# Patient Record
Sex: FEMALE
[De-identification: names, ages, dates, MRNs, and addresses within clinical notes are randomized per-mention and may not be internally consistent; named-entity substitution may affect disease eponyms.]

---

## 2017-07-23 NOTE — ED PDOC
HPI: CCC, URI, Sore Throat


Time Seen by Provider: 17 09:01


Chief Complaint (Nursing): ENT Problem


Chief Complaint (Provider): Throat pain


History Per: Patient


Have you had recent travel within the past 21 days to any of the following 

countries: [place], [place], [place] or [place]?: No


Onset/Duration Of Symptoms: Days (1)


Current Symptoms Are (Timing): Still Present


Location Of Pain: Throat, Diffuse Myalgias


Additional History Per: Patient


Additional Complaint(s): 


The patient is a 40yo female, presents to the ED for evaluation of sore throat 

present for the past day. Patient reports bilateral ear pain and associated 

bodyaches which have been present for the past day. She states her throat pain 

is severe and she has not been able to swallow; denies any PO intake. She 

reports taking Advil for her symptoms with no relief. Patient additionally 

reports some frequency associated with suprapubic pain. She denies any fever, 

cough, chest pain, back pain. She offers no additional medical complaints. 





PCP: [facility]





Past Medical History


Reviewed: Historical Data, Nursing Documentation, Vital Signs


Vital Signs: 


 Last Vital Signs











Temp  98.2 F   17 08:48


 


Pulse  128 H  17 08:48


 


Resp  19   17 08:48


 


BP  151/85 H  17 08:48


 


Pulse Ox  99   17 10:47














- Medical History


PMH: 


   Denies: HTN





- Surgical History


Surgical History:  ( x 2)





- Family History


Family History: States: Unknown Family Hx, Hypertension (mother )





- Home Medications


Home Medications: 


 Ambulatory Orders











 Medication  Instructions  Recorded


 


Ibuprofen [Motrin] 600 mg PO TID 7 Days 16


 


Azithromycin [Zithromax Z-Santosh] 250 mg PO DAILY #5 tab 16


 


Nitrofurantoin Macrocrystals 100 mg PO BID #14 cap 16





[Macrobid]  


 


Naproxen [Naprosyn] 500 mg PO BID PRN #20 tablet 12/10/16


 


Cyclobenzaprine [Cyclobenzaprine 10 mg PO TID #20 tab 17





HCl]  


 


Ibuprofen [Motrin] 600 mg PO Q6 #20 tab 17


 


Ibuprofen [Motrin] 600 mg PO Q6 #20 tab 17














- Allergies


Allergies/Adverse Reactions: 


 Allergies











Allergy/AdvReac Type Severity Reaction Status Date / Time


 


No Known Allergies Allergy   Verified 16 14:48














Review of Systems


ROS Statement: Except As Marked, All Systems Reviewed And Found Negative


Constitutional: Negative for: Fever, Chills


ENT: Positive for: Ear Pain (b/l), Throat Pain


Cardiovascular: Negative for: Chest Pain


Gastrointestinal: Positive for: Abdominal Pain (suprapubic).  Negative for: 

Nausea, Vomiting


Genitourinary Female: Negative for: Dysuria





Physical Exam





- Reviewed


Nursing Documentation Reviewed: Yes


Vital Signs Reviewed: Yes





- Physical Exam


Appears: Positive for: Non-toxic (morbidly obsese), No Acute Distress


Head Exam: Positive for: ATRAUMATIC, NORMAL INSPECTION, NORMOCEPHALIC


Skin: Positive for: Normal Color, Warm, DRY


Eye Exam: Positive for: Normal appearance, EOMI, PERRL


ENT: Positive for: TM Is/Are (clear b/l), Pharyngeal Erythema, Tonsillar 

Exudate (bilateral exudates present), Tonsillar Swelling (hyperemic tonsils, 

symmetric, no abscess noted. ), Other (uvula midline)


Neck: Positive for: Normal, Supple


Cardiovascular/Chest: Positive for: Regular Rate, Rhythm


Respiratory: Positive for: Normal Breath Sounds.  Negative for: Respiratory 

Distress


Gastrointestinal/Abdominal: Positive for: Normal Exam, Soft, Tenderness (mild)


Back: Positive for: Normal Inspection.  Negative for: L CVA Tenderness, R CVA 

Tenderness


Extremity: Positive for: Normal ROM.  Negative for: Deformity, Swelling


Neurologic/Psych: Positive for: Alert, Oriented.  Negative for: Motor/Sensory 

Deficits





- ECG


O2 Sat by Pulse Oximetry: 99 (RA)


Pulse Ox Interpretation: Normal





- Progress


Re-evaluation Time: 10:50


Condition: Re-examined, Improved





Medical Decision Making


Medical Decision Making: 


Time: 0910


Impression: Tonsilitis, dysuria


Differential: Strep, pharyngitis, viral tonsilits, viral syndrome, rule out UTI


Plan:


-- Toradol 30 mg IM


-- Percocet 1 tab PO


-- Rapid Strep


-- Rapid Flu


Reassess





Time: 1047


Rapid flu and Strep test both negative.


Patient reports feeling much better.





Scribe Attestation:


Documented by Yazmin Angela acting as a scribe for Clarissa Avalos MD. 





Provider Attestation:


All medical record entries made by the Scribe were at my direction and 

personally dictated by me. I have reviewed the chart and agree that the record 

accurately reflects my personal performance of the history, physical exam, 

medical decision making, and the department course for this patient. I have 

also personally directed, reviewed, and agree with the discharge instructions 

and disposition.








Disposition





- Clinical Impression


Clinical Impression: 


 Tonsillitis








- Patient ED Disposition


Is Patient to be Admitted: No


Doctor Will See Patient In The: Office


Counseled Patient/Family Regarding: Studies Performed, Diagnosis, Need For 

Followup





- Disposition


Referrals: 


[facility], located within [facility] [Outside]


Disposition: Routine/Home


Disposition Time: 10:53


Condition: GOOD


Additional Instructions: 


Take advil or tylenol for pain. Follow up with your PCP in 2-3 days. 


Prescriptions: 


Ibuprofen [Motrin] 600 mg PO Q6 #20 tab


Instructions:  Tonsillitis (ED)

## 2017-09-22 ENCOUNTER — HOSPITAL ENCOUNTER (EMERGENCY)
Dept: HOSPITAL 14 - H.ER | Age: 42
Discharge: HOME | End: 2017-09-22
Payer: MEDICAID

## 2017-09-22 VITALS — DIASTOLIC BLOOD PRESSURE: 97 MMHG | SYSTOLIC BLOOD PRESSURE: 147 MMHG | RESPIRATION RATE: 18 BRPM | HEART RATE: 85 BPM

## 2017-09-22 VITALS — BODY MASS INDEX: 37.8 KG/M2

## 2017-09-22 VITALS — TEMPERATURE: 97.9 F

## 2017-09-22 DIAGNOSIS — K11.20: Primary | ICD-10-CM

## 2017-09-22 LAB
ALBUMIN/GLOB SERPL: 1.2 {RATIO} (ref 1–2.1)
ALP SERPL-CCNC: 123 U/L (ref 38–126)
ALT SERPL-CCNC: 48 U/L (ref 9–52)
AST SERPL-CCNC: 27 U/L (ref 14–36)
BASOPHILS # BLD AUTO: 0.2 K/UL (ref 0–0.2)
BASOPHILS NFR BLD: 1 % (ref 0–2)
BILIRUB SERPL-MCNC: 0.3 MG/DL (ref 0.2–1.3)
BUN SERPL-MCNC: 13 MG/DL (ref 7–17)
CALCIUM SERPL-MCNC: 9.6 MG/DL (ref 8.4–10.2)
CHLORIDE SERPL-SCNC: 104 MMOL/L (ref 98–107)
CO2 SERPL-SCNC: 26 MMOL/L (ref 22–30)
EOSINOPHIL # BLD AUTO: 1 K/UL (ref 0–0.7)
EOSINOPHIL NFR BLD: 6.1 % (ref 0–4)
ERYTHROCYTE [DISTWIDTH] IN BLOOD BY AUTOMATED COUNT: 13.6 % (ref 11.5–14.5)
GLOBULIN SER-MCNC: 3.6 GM/DL (ref 2.2–3.9)
GLUCOSE SERPL-MCNC: 107 MG/DL (ref 65–105)
HCT VFR BLD CALC: 39.6 % (ref 34–47)
LYMPHOCYTES # BLD AUTO: 4 K/UL (ref 1–4.3)
LYMPHOCYTES NFR BLD AUTO: 23.5 % (ref 20–40)
MCH RBC QN AUTO: 28.2 PG (ref 27–31)
MCHC RBC AUTO-ENTMCNC: 32.7 G/DL (ref 33–37)
MCV RBC AUTO: 86.2 FL (ref 81–99)
MONOCYTES # BLD: 0.8 K/UL (ref 0–0.8)
MONOCYTES NFR BLD: 4.8 % (ref 0–10)
NEUTROPHILS # BLD: 10.9 K/UL (ref 1.8–7)
NEUTROPHILS NFR BLD AUTO: 64.6 % (ref 50–75)
NRBC BLD AUTO-RTO: 0 % (ref 0–0)
PLATELET # BLD: 404 K/UL (ref 130–400)
PMV BLD AUTO: 8 FL (ref 7.2–11.7)
POTASSIUM SERPL-SCNC: 3.8 MMOL/L (ref 3.6–5)
PROT SERPL-MCNC: 7.8 G/DL (ref 6.3–8.2)
SODIUM SERPL-SCNC: 142 MMOL/L (ref 132–148)
WBC # BLD AUTO: 16.9 K/UL (ref 4.8–10.8)

## 2017-09-22 PROCEDURE — 96374 THER/PROPH/DIAG INJ IV PUSH: CPT

## 2017-09-22 PROCEDURE — 87040 BLOOD CULTURE FOR BACTERIA: CPT

## 2017-09-22 PROCEDURE — 70488 CT MAXILLOFACIAL W/O & W/DYE: CPT

## 2017-09-22 PROCEDURE — 96375 TX/PRO/DX INJ NEW DRUG ADDON: CPT

## 2017-09-22 PROCEDURE — 85025 COMPLETE CBC W/AUTO DIFF WBC: CPT

## 2017-09-22 PROCEDURE — 80053 COMPREHEN METABOLIC PANEL: CPT

## 2017-09-22 PROCEDURE — 81025 URINE PREGNANCY TEST: CPT

## 2017-09-22 PROCEDURE — 99281 EMR DPT VST MAYX REQ PHY/QHP: CPT

## 2017-09-22 NOTE — ED PDOC
HPI: General Adult


Time Seen by Provider: 17 19:05


Chief Complaint (Nursing): Abnormal Skin Integrity


Chief Complaint (Provider): Left sided facial pain


History Per: Patient


Onset/Duration Of Symptoms: Hrs


Additional Complaint(s): 








41 y/o female presents to the emergency department with swelling to the left 

side of the face and tingling of the tongue that began today, 2017. 

Denies dental pain or any further medical complaints.





Past Medical History


Reviewed: Historical Data, Nursing Documentation, Vital Signs


Vital Signs: 


 Last Vital Signs











Temp  97.9 F   17 23:48


 


Pulse  85   17 23:48


 


Resp  18   17 23:48


 


BP  147/97 H  17 23:48


 


Pulse Ox  100   17 23:48














- Medical History


PMH: No Chronic Diseases


   Denies: HTN





- Surgical History


Surgical History:  ( x 2)





- Family History


Family History: States: Unknown Family Hx, Hypertension (mother )





- Home Medications


Home Medications: 


 Ambulatory Orders











 Medication  Instructions  Recorded


 


Ibuprofen [Motrin] 600 mg PO TID 7 Days  tab 16


 


Azithromycin [Zithromax Z-Santosh] 250 mg PO DAILY #5 tab 16


 


Nitrofurantoin Macrocrystals 100 mg PO BID #14 cap 16





[Macrobid]  


 


Naproxen [Naprosyn] 500 mg PO BID PRN #20 tablet 12/10/16


 


Cyclobenzaprine [Cyclobenzaprine 10 mg PO TID #20 tab 17





HCl]  


 


Ibuprofen [Motrin] 600 mg PO Q6 #20 tab 17


 


Benzocaine/Menthol [Cepacol Sore 1 each MM Q6 PRN #20 lozenge 17





Throat Lozenge]  


 


Ibuprofen [Motrin] 600 mg PO Q6 #20 tab 17


 


Amoxicillin/Clavulanate [Augmentin 1 tab PO BID #14 tab 17





875 MG-125 MG]  


 


Methylprednisolone [Medrol Dose 4 mg PO DAILY #21 mg 17





Pack (21 tabs)]  


 


traMADol [Ultram] 50 mg PO TID #10 tab 17














- Allergies


Allergies/Adverse Reactions: 


 Allergies











Allergy/AdvReac Type Severity Reaction Status Date / Time


 


No Known Allergies Allergy   Verified 17 18:57














Review of Systems


ROS Statement: Except As Marked, All Systems Reviewed And Found Negative


ENT: Positive for: Other (Left-sided facial swelling with tingling of the 

tongue. No dental pain)





Physical Exam





- Reviewed


Nursing Documentation Reviewed: Yes


Vital Signs Reviewed: Yes





- Physical Exam


Appears: Positive for: Non-toxic, No Acute Distress


Head Exam: Positive for: ATRAUMATIC, NORMAL INSPECTION, NORMOCEPHALIC


Skin: Positive for: Normal Color, Warm, Dry


Eye Exam: Positive for: Other (left sided parotoid tenderness with edema).  

Negative for: Normal appearance


Neck: Positive for: Normal, Supple


Neurologic/Psych: Positive for: Alert, Oriented (x3)





- Laboratory Results


Result Diagrams: 


 17 20:40





 17 20:40





- ECG


O2 Sat by Pulse Oximetry: 98 (RA)


Pulse Ox Interpretation: Normal





Medical Decision Making


Medical Decision Making: 





Time: 19:31


Initial impression: Parotitis


Initial plan:


--Maxillofacial w/ contrast CT


--CMP


--CBC w/ diff


--Blood Culture


--Reevaluation 





Time: 21:28


--Cleocin 600 mg IV


--Toradol 30 mg IVP


--Methylprednisolone 125 mg IVP








CT:


 Left parotiditis.  





WBC 16.9





Pt doing well on re-eval, reports pain improved 





Pt given RX to continue antibiotics, advised sour candy, lots of fluid and ENT 

follow up. Return to ED if at anytime condition worsens





Scribe Attestation:


Documented by Evelia Jacob, acting as a scribe for Asuncion Saravia PA-C





Provider Scribe Attestation:


All medical record entries made by the Scribe were at my direction and 

personally dictated by me. I have reviewed the chart and agree that the record 

accurately reflects my personal performance of the history, physical exam, 

medical decision making, and the department course for this patient. I have 

also personally directed, reviewed, and agree with the discharge instructions 

and disposition.





Disposition





- Clinical Impression


Clinical Impression: 


 Parotiditis








- Patient ED Disposition


Is Patient to be Admitted: No





- Disposition


Disposition: Routine/Home


Disposition Time: 21:45


Condition: STABLE


Prescriptions: 


Amoxicillin/Clavulanate [Augmentin 875 MG-125 MG] 1 tab PO BID #14 tab


Methylprednisolone [Medrol Dose Pack (21 tabs)] 4 mg PO DAILY #21 mg


traMADol [Ultram] 50 mg PO TID #10 tab


Instructions:  Sialoadenitis (ED)


Forms:  [facility] (English)

## 2017-09-22 NOTE — ED PDOC
HPI:Nausea, Vomiting, Diarrhea


Time Seen by Provider: 17 19:05


Chief Complaint (Nursing): Abnormal Skin Integrity


Chief Complaint (Provider): Abnormal Skin Integrity


History Per: Patient


History/Exam Limitations: no limitations


Onset/Duration Of Symptoms: Hrs


Current Symptoms Are (Timing): Still Present


Additional Complaint(s): 





43 y/o female presents to the emergency department with swelling to the left 

side of the face and tingling of the tongue that began today, 2017. 

Denies dental pain or any further medical complaints. 





Past Medical History


Reviewed: Historical Data, Nursing Documentation, Vital Signs


Vital Signs: 


 Last Vital Signs











Temp  97.9 F   17 18:57


 


Pulse  88   17 18:57


 


Resp  20   17 18:57


 


BP  158/98 H  17 18:57


 


Pulse Ox  98   17 18:57














- Medical History


PMH: No Chronic Diseases


   Denies: HTN





- Surgical History


Surgical History:  ( x 2)





- Family History


Family History: States: Unknown Family Hx, Hypertension (mother )





- Home Medications


Home Medications: 


 Ambulatory Orders











 Medication  Instructions  Recorded


 


Ibuprofen [Motrin] 600 mg PO TID 7 Days  tab 16


 


Azithromycin [Zithromax Z-Santosh] 250 mg PO DAILY #5 tab 16


 


Nitrofurantoin Macrocrystals 100 mg PO BID #14 cap 16





[Macrobid]  


 


Naproxen [Naprosyn] 500 mg PO BID PRN #20 tablet 12/10/16


 


Cyclobenzaprine [Cyclobenzaprine 10 mg PO TID #20 tab 17





HCl]  


 


Ibuprofen [Motrin] 600 mg PO Q6 #20 tab 17


 


Benzocaine/Menthol [Cepacol Sore 1 each MM Q6 PRN #20 lozenge 17





Throat Lozenge]  


 


Ibuprofen [Motrin] 600 mg PO Q6 #20 tab 17














- Allergies


Allergies/Adverse Reactions: 


 Allergies











Allergy/AdvReac Type Severity Reaction Status Date / Time


 


No Known Allergies Allergy   Verified 17 18:57














Review of Systems


ROS Statement: Except As Marked, All Systems Reviewed And Found Negative


ENT: Positive for: Other (Facial swelling and tingling of the tongue. No dental 

pain. )





Physical Exam





- Reviewed


Nursing Documentation Reviewed: Yes


Vital Signs Reviewed: Yes





- Physical Exam


Appears: Positive for: Non-toxic, No Acute Distress


Head Exam: Positive for: ATRAUMATIC, NORMAL INSPECTION, NORMOCEPHALIC


Skin: Positive for: Normal Color, Warm, Dry


ENT: Positive for: Other (left sided parotoid tenderness with edema. ).  

Negative for: Normal ENT Inspection


Neck: Positive for: Normal, Supple


Neurologic/Psych: Positive for: Alert, Oriented (x3)





- ECG


O2 Sat by Pulse Oximetry: 98 (RA)


Pulse Ox Interpretation: Normal





Medical Decision Making


Medical Decision Making: 





Time: 19:31


Initial impression: Parotitis


Initial plan:


--Maxillofacial w/ contrast CT


--CMP


--CBC w/ diff


--Blood Culture


--Reevaluation 





Scribe Attestation:


Documented by Evelia Jacob, acting as a scribe for Asuncion Saravia PA-C





Provider Scribe Attestation:


All medical record entries made by the Scribe were at my direction and 

personally dictated by me. I have reviewed the chart and agree that the record 

accurately reflects my personal performance of the history, physical exam, 

medical decision making, and the department course for this patient. I have 

also personally directed, reviewed, and agree with the discharge instructions 

and disposition.





Disposition





- Disposition

## 2017-09-22 NOTE — CT
EXAM:

  CT Maxillofacial With Intravenous Contrast



EXAM DATE/TIME:

  9/22/2017 7:31 PM



CLINICAL HISTORY:

  42 years old, female; Signs and symptoms; Other: Left parotid edema erythema; 

Additional info: Left parotid edema and erythema. Sent phy. Doc.



TECHNIQUE:

  Axial computed tomography images of the face with intravenous contrast.  All 

CT scans at this facility use one or more dose reduction techniques, viz.: 

automated exposure control; ma/kV adjustment per patient size (including 

targeted exams where dose is matched to indication; i.e. head); or iterative 

reconstruction technique.

  Coronal and sagittal reformatted images were created and reviewed.



CONTRAST:

  90 mL of [id number] administered intravenously.



COMPARISON:

  No relevant prior studies available.



FINDINGS:

   

There is artifact secondary to dental hardware.



  The left parotid gland is prominent with indistinct borders and stranding in 

the surrounding fat. Findings supportive of acute infectious/inflammatory 

process/ parotiditis. 



  Multiple lymph nodes are present along the inferior aspect of the inflamed 

left parotid gland.



  No abscess.



   Bilateral tonsillar edema.



  The airway is patent.



   No significant fluid in the sinuses or mastoid air cells.





IMPRESSION:   



Left parotiditis.

## 2017-09-23 VITALS — OXYGEN SATURATION: 98 %

## 2017-09-30 ENCOUNTER — HOSPITAL ENCOUNTER (OUTPATIENT)
Dept: HOSPITAL 14 - H.ER | Age: 42
Setting detail: OBSERVATION
LOS: 2 days | Discharge: HOME | End: 2017-10-02
Attending: INTERNAL MEDICINE | Admitting: INTERNAL MEDICINE
Payer: MEDICAID

## 2017-09-30 VITALS — BODY MASS INDEX: 39.4 KG/M2

## 2017-09-30 DIAGNOSIS — N20.0: Primary | ICD-10-CM

## 2017-09-30 DIAGNOSIS — E66.01: ICD-10-CM

## 2017-09-30 DIAGNOSIS — Z87.891: ICD-10-CM

## 2017-09-30 DIAGNOSIS — R03.0: ICD-10-CM

## 2017-09-30 DIAGNOSIS — D72.829: ICD-10-CM

## 2017-09-30 DIAGNOSIS — Z98.84: ICD-10-CM

## 2017-09-30 LAB
ALBUMIN/GLOB SERPL: 1.1 {RATIO} (ref 1–2.1)
ALP SERPL-CCNC: 108 U/L (ref 38–126)
ALT SERPL-CCNC: 37 U/L (ref 9–52)
AST SERPL-CCNC: 43 U/L (ref 14–36)
BASE EXCESS BLDV CALC-SCNC: 1.2 MMOL/L (ref 0–2)
BASOPHILS # BLD AUTO: 0.1 K/UL (ref 0–0.2)
BASOPHILS NFR BLD: 0.6 % (ref 0–2)
BILIRUB SERPL-MCNC: 1 MG/DL (ref 0.2–1.3)
BILIRUB UR-MCNC: NEGATIVE MG/DL
BUN SERPL-MCNC: 12 MG/DL (ref 7–17)
CALCIUM SERPL-MCNC: 9.2 MG/DL (ref 8.4–10.2)
CHLORIDE SERPL-SCNC: 100 MMOL/L (ref 98–107)
CO2 SERPL-SCNC: 24 MMOL/L (ref 22–30)
COLOR UR: YELLOW
EOSINOPHIL # BLD AUTO: 0 K/UL (ref 0–0.7)
EOSINOPHIL NFR BLD: 0.1 % (ref 0–4)
ERYTHROCYTE [DISTWIDTH] IN BLOOD BY AUTOMATED COUNT: 13.5 % (ref 11.5–14.5)
GLOBULIN SER-MCNC: 3.9 GM/DL (ref 2.2–3.9)
GLUCOSE SERPL-MCNC: 130 MG/DL (ref 65–105)
GLUCOSE UR STRIP-MCNC: (no result) MG/DL
HCT VFR BLD CALC: 42.8 % (ref 34–47)
KETONES UR STRIP-MCNC: 80 MG/DL
LEUKOCYTE ESTERASE UR-ACNC: (no result) LEU/UL
LIPASE SERPL-CCNC: 74 U/L (ref 23–300)
LYMPHOCYTES # BLD AUTO: 2.7 K/UL (ref 1–4.3)
LYMPHOCYTES NFR BLD AUTO: 10.7 % (ref 20–40)
MCH RBC QN AUTO: 28.6 PG (ref 27–31)
MCHC RBC AUTO-ENTMCNC: 33.6 G/DL (ref 33–37)
MCV RBC AUTO: 85.1 FL (ref 81–99)
MONOCYTES # BLD: 1.3 K/UL (ref 0–0.8)
MONOCYTES NFR BLD: 5 % (ref 0–10)
NEUTROPHILS # BLD: 20.8 K/UL (ref 1.8–7)
NEUTROPHILS NFR BLD AUTO: 83.6 % (ref 50–75)
NRBC BLD AUTO-RTO: 0.1 % (ref 0–0)
PCO2 BLDV: 44 MMHG (ref 40–60)
PH BLDV: 7.39 [PH] (ref 7.32–7.43)
PH UR STRIP: 8 [PH] (ref 5–8)
PLATELET # BLD: 462 K/UL (ref 130–400)
PMV BLD AUTO: 8.6 FL (ref 7.2–11.7)
POTASSIUM SERPL-SCNC: 5 MMOL/L (ref 3.6–5)
PROT SERPL-MCNC: 8.1 G/DL (ref 6.3–8.2)
PROT UR STRIP-MCNC: 30 MG/DL
RBC # UR STRIP: NEGATIVE /UL
RBC #/AREA URNS HPF: 5 /HPF (ref 0–3)
SODIUM SERPL-SCNC: 140 MMOL/L (ref 132–148)
SP GR UR STRIP: 1.02 (ref 1–1.03)
UROBILINOGEN UR-MCNC: (no result) MG/DL (ref 0.2–1)
WBC # BLD AUTO: 24.9 K/UL (ref 4.8–10.8)
WBC #/AREA URNS HPF: 6 /HPF (ref 0–5)

## 2017-09-30 PROCEDURE — 82607 VITAMIN B-12: CPT

## 2017-09-30 PROCEDURE — 87040 BLOOD CULTURE FOR BACTERIA: CPT

## 2017-09-30 PROCEDURE — 82803 BLOOD GASES ANY COMBINATION: CPT

## 2017-09-30 PROCEDURE — 74177 CT ABD & PELVIS W/CONTRAST: CPT

## 2017-09-30 PROCEDURE — 96374 THER/PROPH/DIAG INJ IV PUSH: CPT

## 2017-09-30 PROCEDURE — 80053 COMPREHEN METABOLIC PANEL: CPT

## 2017-09-30 PROCEDURE — 85025 COMPLETE CBC W/AUTO DIFF WBC: CPT

## 2017-09-30 PROCEDURE — 99285 EMERGENCY DEPT VISIT HI MDM: CPT

## 2017-09-30 PROCEDURE — 74022 RADEX COMPL AQT ABD SERIES: CPT

## 2017-09-30 PROCEDURE — 36415 COLL VENOUS BLD VENIPUNCTURE: CPT

## 2017-09-30 PROCEDURE — 85027 COMPLETE CBC AUTOMATED: CPT

## 2017-09-30 PROCEDURE — 81003 URINALYSIS AUTO W/O SCOPE: CPT

## 2017-09-30 PROCEDURE — 96375 TX/PRO/DX INJ NEW DRUG ADDON: CPT

## 2017-09-30 PROCEDURE — 81025 URINE PREGNANCY TEST: CPT

## 2017-09-30 PROCEDURE — 87086 URINE CULTURE/COLONY COUNT: CPT

## 2017-09-30 PROCEDURE — 83036 HEMOGLOBIN GLYCOSYLATED A1C: CPT

## 2017-09-30 PROCEDURE — 83690 ASSAY OF LIPASE: CPT

## 2017-09-30 PROCEDURE — 96376 TX/PRO/DX INJ SAME DRUG ADON: CPT

## 2017-09-30 PROCEDURE — 80061 LIPID PANEL: CPT

## 2017-09-30 PROCEDURE — 84443 ASSAY THYROID STIM HORMONE: CPT

## 2017-09-30 RX ADMIN — HYOSCYAMINE SULFATE SCH MG: 0.12 TABLET ORAL at 21:06

## 2017-09-30 RX ADMIN — HYDROMORPHONE HYDROCHLORIDE STA MG: 1 INJECTION, SOLUTION INTRAMUSCULAR; INTRAVENOUS; SUBCUTANEOUS at 11:54

## 2017-09-30 RX ADMIN — HYDROMORPHONE HYDROCHLORIDE STA MG: 1 INJECTION, SOLUTION INTRAMUSCULAR; INTRAVENOUS; SUBCUTANEOUS at 17:06

## 2017-09-30 NOTE — RAD
PROCEDURE:  Radiographs of the chest and abdomen (obstructive series)



HISTORY:

ABDOMINAL PAIN  



COMPARISON:

Abdomen and pelvis CT 09/30/2017.



TECHNIQUE:

AP radiograph of the chest, with upright and supine radiographs of 

the abdomen.



FINDINGS:



CHEST:

Lungs: Clear.



Cardiovascular: Cardiomegaly noted. No pulmonary vascular derangement 

identified.



Pleura: No pleural fluid. No pneumothorax.



Other findings: None.



ABDOMEN AND PELVIS:

Bowel: Nonobstructive bowel gas pattern appreciate. No free 

intrarenal gas evident. Overall pattern is concurrent with CT exam 

09/30/2017 as well.



Free air: None.



Bones:  Unremarkable.



Other findings: Tubing from gastric balloon noted at the region of 

the stomach.



IMPRESSION:

As above.

## 2017-09-30 NOTE — ED PDOC
HPI: Abdomen


Time Seen by Provider: 17 10:38


Chief Complaint (Nursing): Abdominal Pain


Chief Complaint (Provider): ABDOMINAL PAIN


History Per: Patient (43 Y/O FEMALE HERE WITH EPIGASTRIC PAIN ASSOCIATED WITH 

VOMITING. HAS HAD DUAL BALLOON PLACEMENT IN STOMACHE YESTERDAY BY DR. DAVIS.  

FOSTER ELLIS FEVERS/CHILLS/DYSURIA. NORMAL BM TODAY.)





Past Medical History


Reviewed: Historical Data, Nursing Documentation, Vital Signs


Vital Signs: 


 Last Vital Signs











Temp  98.7 F   17 10:10


 


Pulse  75   17 10:10


 


Resp  17   17 10:10


 


BP  156/77 H  17 10:10


 


Pulse Ox  100   17 12:03














- Medical History


PMH: 


   Denies: HTN





- Surgical History


Surgical History:  ( x 2)





- Family History


Family History: States: Unknown Family Hx, Hypertension (mother )





- Home Medications


Home Medications: 


 Ambulatory Orders











 Medication  Instructions  Recorded


 


Nitrofurantoin Macrocrystals 100 mg PO BID #14 cap 16





[Macrobid]  


 


Naproxen [Naprosyn] 500 mg PO BID PRN #20 tablet 12/10/16


 


Cyclobenzaprine [Cyclobenzaprine 10 mg PO TID #20 tab 17





HCl]  


 


Methylprednisolone [Medrol Dose 4 mg PO DAILY #21 mg 17





Pack (21 tabs)]  


 


traMADol [Ultram] 50 mg PO TID #10 tab 17














- Allergies


Allergies/Adverse Reactions: 


 Allergies











Allergy/AdvReac Type Severity Reaction Status Date / Time


 


No Known Allergies Allergy   Verified 17 18:57














Review of Systems


ROS Statement: Except As Marked, All Systems Reviewed And Found Negative





Physical Exam





- Reviewed


Nursing Documentation Reviewed: Yes


Vital Signs Reviewed: Yes





- Physical Exam


Appears: Positive for: Well, Non-toxic, No Acute Distress


Head Exam: Positive for: ATRAUMATIC, NORMAL INSPECTION, NORMOCEPHALIC


Skin: Positive for: Normal Color, Warm, DRY


Eye Exam: Positive for: EOMI, Normal appearance, PERRL


ENT: Positive for: Normal ENT Inspection


Neck: Positive for: Normal, Painless ROM


Cardiovascular/Chest: Positive for: Regular Rate, Rhythm


Respiratory: Positive for: CNT, Normal Breath Sounds


Gastrointestinal/Abdominal: Positive for: Normal Exam, Bowel Sounds, Soft


Back: Positive for: Normal Inspection


Extremity: Positive for: Normal ROM


Neurologic/Psych: Positive for: Alert, Oriented





- Laboratory Results


Result Diagrams: 


 17 11:00





 17 11:00





- ECG


O2 Sat by Pulse Oximetry: 100





ED OBSERVATION


Date of observation admission: 17


Time of observation admission: 12:02





- Observation admission statement


Patient is being placed in observation because:: 





ABDOMINAL PAIN





- Goals of Observation


Goals of observation are:: 





EVALUATION/ASSESSMENT OF ABDOMINAL PAIN


IMPROVEMENT OF SYMPTOMS





- Progress Note


Progress Note: 





17 12:02








ZOFRAN 8 MG IV X 1 DOSE


PEPCID 20 MG IV X 1 DOSE





NS 1 LITER WIDE OPEN





PATIENT PREPPED FOR CT ABD/PELIVS


UNABLE TO TOLERATE





PHENERGAN 25 MG IV 





DILAUDID 0.5 MG IV 





CBC NOTED ELEVATED >20.  WILL SEND LACTATE/BC/UCX.





 FINDINGS:  


   Lower thorax:  Small hiatal hernia.  


 


  ABDOMEN:  


   Liver:  Unremarkable.  


   Gallbladder and bile ducts:  No calcified stones.  No ductal dilation.  


   Pancreas:  Unremarkable.  No ductal dilation.  


   Spleen:  No splenomegaly.  


   Adrenals:  No mass.  


   Kidneys and ureters:  Few small calculi within LEFT kidney.  No   


 hydronephrosis.  


   Stomach and bowel:  Cecum within pelvis.  No definite mural thickening.  No 

  


 obstruction.  


   Appendix:  No findings to suggest acute appendicitis.  


 


  PELVIS:  


   Bladder:  Unremarkable.  No stones.  


   Reproductive:  Small ovarian follicles.  


 


  ABDOMEN and PELVIS:  


   Intraperitoneal space:  No significant fluid collection.  No free air.  


   Bones/joints:  Chronic L5 pars defects.  Mild curvature of spine.  Mild   


 degenerative changes of hip joints.  No acute fracture.  


   Soft tissues:  Unremarkable.  


   Vasculature:  Unremarkable.  No aneurysm.  


   Lymph nodes:  No pathologically enlarged lymph nodes.  


 


 IMPRESSION:       


 1.  Nonobstructing renal calculi.  


 2.  Incidental/non-acute findings are described above.  


 


                                                            Dictated By:  

Evans Alfaro MD      


                                                            Dictated Date/Time:

  17  


                                                            Signed By: Evans Alfaro MD  


                                                            Date Signed: 55  


                                                Transcribed By: KAVITHA  


                                                            Transcribe Date/Time

: 17 16:39








Disposition





- Clinical Impression


Clinical Impression: 


 Intractable vomiting, Leukocytosis, Abdominal pain in female








- Patient ED Disposition


Is Patient to be Admitted: Yes





- Disposition


Disposition Time: 15:15


Condition: FAIR





- Pt Status Changed To:


Hospital Disposition Of: Observation

## 2017-10-01 VITALS — RESPIRATION RATE: 20 BRPM

## 2017-10-01 VITALS — OXYGEN SATURATION: 98 %

## 2017-10-01 LAB — TSH SERPL-ACNC: 1.06 MIU/ML (ref 0.46–4.68)

## 2017-10-01 RX ADMIN — HYOSCYAMINE SULFATE SCH MG: 0.12 TABLET ORAL at 10:21

## 2017-10-01 RX ADMIN — HYOSCYAMINE SULFATE SCH MG: 0.12 TABLET ORAL at 04:31

## 2017-10-01 RX ADMIN — HYOSCYAMINE SULFATE SCH MG: 0.12 TABLET ORAL at 20:18

## 2017-10-01 RX ADMIN — PANTOPRAZOLE SODIUM SCH MG: 40 TABLET, DELAYED RELEASE ORAL at 10:22

## 2017-10-01 RX ADMIN — HYOSCYAMINE SULFATE SCH MG: 0.12 TABLET ORAL at 13:47

## 2017-10-01 RX ADMIN — HYOSCYAMINE SULFATE SCH: 0.12 TABLET ORAL at 01:35

## 2017-10-01 RX ADMIN — HYOSCYAMINE SULFATE SCH MG: 0.12 TABLET ORAL at 17:24

## 2017-10-01 NOTE — CP.PCM.CON
History of Present Illness





- History of Present Illness


History of Present Illness: 





Consult note- General Surgery





42F s/p gastric procedure 'Reshape" POD#2 presented to [facility] ED with multiple 

episodes NBNB vomiting, nausea and abdominal pain that started Friday night 

into Saturday. Procedure was performed by Dr. Sullivan at [place]. Denies 

fevers, chills, chest pain, shortness of breath, numbness/tingling down 

extremities.





Boyfriend present at bedside, would not like to discuss her medical history 

while he is in the room.  





PMH: none


PSH: abdominoplasty, Gastric "reshape" (balloon),  x2


ALL: NKDA





Review of Systems





- Review of Systems


All systems: reviewed and no additional remarkable complaints except





- Constitutional


Constitutional: As Per HPI





Past Patient History





- Infectious Disease


Hx of Infectious Diseases: None





- Past Medical History & Family History


Past Medical History?: No





- Past Social History


Smoking Status: Former Smoker





- CARDIAC


Hx Cardiac Disorders: No





- PULMONARY


Hx Respiratory Disorders: No





- NEUROLOGICAL


Hx Neurological Disorder: No





- HEENT


Hx HEENT Problems: No





- RENAL


Hx Chronic Kidney Disease: No





- ENDOCRINE/METABOLIC


Hx Endocrine Disorders: No





- HEMATOLOGICAL/ONCOLOGICAL


Hx Blood Disorders: No





- INTEGUMENTARY


Hx Dermatological Problems: No





- MUSCULOSKELETAL/RHEUMATOLOGICAL


Hx Musculoskeletal Disorders: No


Hx Falls: No





- GASTROINTESTINAL


Hx Gastrointestinal Disorders: No





- GENITOURINARY/GYNECOLOGICAL


Hx Genitourinary Disorders: No





- PSYCHIATRIC


Hx Psychophysiologic Disorder: No


Hx Substance Use: No





- SURGICAL HISTORY


Hx Surgeries: Yes


Hx  Section: Yes (x2)


Other/Comment: gastric baloon 17.  Ene Cohen 3 yrs ago





- ANESTHESIA


Hx Anesthesia: Yes


Hx Anesthesia Reactions: No





Meds


Allergies/Adverse Reactions: 


 Allergies











Allergy/AdvReac Type Severity Reaction Status Date / Time


 


No Known Allergies Allergy   Verified 17 18:57














- Medications


Medications: 


 Current Medications





Cyclobenzaprine HCl (Flexeril)  10 mg PO TID [facility]


   Last Admin: 10/01/17 10:20 Dose:  10 mg


Diazepam (Valium)  5 mg PO Q4 PRN


   PRN Reason: Sleep


Famotidine (Pepcid)  20 mg IVP Q12 [facility]


   Last Admin: 17 21:09 Dose:  20 mg


Hyoscyamine (Levsin)  0.125 mg PO Q4 [facility]


   Last Admin: 10/01/17 10:21 Dose:  0.125 mg


Naproxen (Naproxen)  500 mg PO BID PRN


   PRN Reason: Pain, moderate (4-7)


   Last Admin: 10/01/17 10:20 Dose:  500 mg


Nitrofurantoin Macrocrystals (Macrobid)  100 mg PO BID [facility]


   Last Admin: 10/01/17 10:20 Dose:  100 mg


Ondansetron HCl (Zofran Inj)  4 mg IVP Q6 PRN


   PRN Reason: Nausea/Vomiting


   Last Admin: 10/01/17 04:15 Dose:  4 mg


Pantoprazole Sodium (Protonix Ec Tab)  40 mg PO DAILY [facility]


   Last Admin: 10/01/17 10:22 Dose:  40 mg


Tramadol HCl (Ultram)  50 mg PO TID [facility]











Physical Exam





- Constitutional


Appears: Non-toxic, No Acute Distress





- Head Exam


Head Exam: ATRAUMATIC





- Eye Exam


Eye Exam: EOMI.  absent: Scleral icterus





- ENT Exam


ENT Exam: Mucous Membranes Moist





- Respiratory Exam


Respiratory Exam: NORMAL BREATHING PATTERN.  absent: Accessory Muscle Use, 

Respiratory Distress





- Cardiovascular Exam


Cardiovascular Exam: +S1, +S2





- GI/Abdominal Exam


GI & Abdominal Exam: Soft, Tenderness.  absent: Distended, Firm, Guarding, 

Hernia, Organomegaly, Pulsatile Mass, Rebound, Rigid





- Extremities Exam


Extremities exam: Positive for: normal inspection.  Negative for: calf 

tenderness





- Back Exam


Back exam: absent: CVA tenderness (L), CVA tenderness (R)





- Neurological Exam


Neurological exam: Alert, Oriented x3





- Skin


Skin Exam: Normal Color, Warm





Results





- Vital Signs


Recent Vital Signs: 


 Last Vital Signs











Temp  98.1 F   10/01/17 07:48


 


Pulse  68   10/01/17 07:48


 


Resp  19   10/01/17 07:48


 


BP  165/93 H  10/01/17 07:48


 


Pulse Ox  98   10/01/17 07:48














- Labs


Result Diagrams: 


 17 11:00





 17 11:00


Labs: 


 Laboratory Results - last 24 hr











  17





  11:00 11:00 11:00


 


WBC  24.9 H  


 


RBC  5.03  


 


Hgb  14.4  


 


Hct  42.8  


 


MCV  85.1  


 


MCH  28.6  


 


MCHC  33.6  


 


RDW  13.5  


 


Plt Count  462 H  


 


MPV  8.6  


 


Neut % (Auto)  83.6 H  


 


Lymph % (Auto)  10.7 L  


 


Mono % (Auto)  5.0  


 


Eos % (Auto)  0.1  


 


Baso % (Auto)  0.6  


 


Neut #  20.8 H  


 


Lymph #  2.7  


 


Mono #  1.3 H  


 


Eos #  0.0  


 


Baso #  0.1  


 


pO2   


 


VBG pH   


 


VBG pCO2   


 


VBG HCO3   


 


VBG Total CO2   


 


VBG O2 Sat (Calc)   


 


VBG Base Excess   


 


VBG Potassium   


 


Glucose   


 


Lactate   


 


FiO2   


 


Sodium   140 


 


Potassium   5.0 


 


Chloride   100 


 


Carbon Dioxide   24 


 


Anion Gap   21 H 


 


BUN   12 


 


Creatinine   0.5 L 


 


Est GFR ( Amer)   > 60 


 


Est GFR (Non-Af Amer)   > 60 


 


Random Glucose   130 H 


 


Calcium   9.2 


 


Total Bilirubin   1.0 


 


AST   43 H D 


 


ALT   37 


 


Alkaline Phosphatase   108 


 


Total Protein   8.1 


 


Albumin   4.2 


 


Globulin   3.9 


 


Albumin/Globulin Ratio   1.1 


 


Lipase   74 


 


Vitamin B12   


 


TSH 3rd Generation   


 


Venous Blood Potassium   


 


Urine Color    Yellow


 


Urine Clarity    Turbid


 


Urine pH    8.0


 


Ur Specific Gravity    1.018


 


Urine Protein    30


 


Urine Glucose (UA)    Neg


 


Urine Ketones    80


 


Urine Blood    Negative


 


Urine Nitrate    Negative


 


Urine Bilirubin    Negative


 


Urine Urobilinogen    0.2-1.0


 


Ur Leukocyte Esterase    Neg


 


Urine RBC (Auto)    5 H


 


Urine Microscopic WBC    6 H


 


Ur Squamous Epith Cells    5


 


Amorphous Sediment    Few H














  09/30/17 10/01/17





  12:24 08:30


 


WBC  


 


RBC  


 


Hgb  


 


Hct  


 


MCV  


 


MCH  


 


MCHC  


 


RDW  


 


Plt Count  


 


MPV  


 


Neut % (Auto)  


 


Lymph % (Auto)  


 


Mono % (Auto)  


 


Eos % (Auto)  


 


Baso % (Auto)  


 


Neut #  


 


Lymph #  


 


Mono #  


 


Eos #  


 


Baso #  


 


pO2  57 H 


 


VBG pH  7.39 


 


VBG pCO2  44 


 


VBG HCO3  25.6 


 


VBG Total CO2  28.0 


 


VBG O2 Sat (Calc)  92.9 H 


 


VBG Base Excess  1.2 


 


VBG Potassium  3.7 


 


Glucose  139 H 


 


Lactate  1.2 


 


FiO2  21.0 


 


Sodium  135.0 


 


Potassium  


 


Chloride  102.0 


 


Carbon Dioxide  


 


Anion Gap  


 


BUN  


 


Creatinine  


 


Est GFR ( Amer)  


 


Est GFR (Non-Af Amer)  


 


Random Glucose  


 


Calcium  


 


Total Bilirubin  


 


AST  


 


ALT  


 


Alkaline Phosphatase  


 


Total Protein  


 


Albumin  


 


Globulin  


 


Albumin/Globulin Ratio  


 


Lipase  


 


Vitamin B12   481


 


TSH 3rd Generation   1.06


 


Venous Blood Potassium  3.7 


 


Urine Color  


 


Urine Clarity  


 


Urine pH  


 


Ur Specific Gravity  


 


Urine Protein  


 


Urine Glucose (UA)  


 


Urine Ketones  


 


Urine Blood  


 


Urine Nitrate  


 


Urine Bilirubin  


 


Urine Urobilinogen  


 


Ur Leukocyte Esterase  


 


Urine RBC (Auto)  


 


Urine Microscopic WBC  


 


Ur Squamous Epith Cells  


 


Amorphous Sediment  














Assessment & Plan





- Assessment and Plan (Free Text)


Assessment: 





42F w/ abd pain s/p gastric reshape POD#2 from [place]


Plan: 





spoke with Dr. Sullivan on the phone. Is aware patient is in the hospital. Advised 

that this is normal post op pain and that patient can call him and his office 

directly. 


- bolus 1L IV fluid


- scopalamine patch


- no surgical intervention


- pt to follow up in office with Dr. Sullivan





will d/w Dr. Favio Hollingsworth PGY1

## 2017-10-01 NOTE — CP.PCM.CON
<Mery Allen - Last Filed: 10/01/17 15:32>





History of Present Illness





- History of Present Illness


History of Present Illness: 


PGY4 Initial Consult Note 





Cortney Alvarado is a 42F with no sig hx other than s/p gastric procedure '

Reshape" vs gastric balloon? POD#2 presented to [facility] ED with complaints of 

multiple episodes vomiting, nausea and abdominal pain. Pt states that the pain 

started a few hrs after the procedure and has progressively worsened. She 

states that the pain is 10 out of 10. She states that the pain is diffuse. She 

notes that she followed the instructions provided to her after the procedure. 

She states that her symptoms worsen after any PO intake liquid or solids. She 

states that she has normal BM daily without melena, hematemsis, BRBPR, or coffee

-ground emesis. The procedure was performed by Dr. Sullivan at [place]. Denies 

fevers, chills, chest pain, shortness of breath, numbness/tingling down 

extremities.


 





PMH: none


PSH: abdominoplasty, Gastric "reshape" (balloon),  x2


ALL: NKDA


Social hx: denies etoh, smoking or illicit drugs


Endoscopy hx: denies 





ROS: 12 point ROS conducted neg other than above 








Past Patient History





- Infectious Disease


Hx of Infectious Diseases: None





- Past Medical History & Family History


Past Medical History?: No





- Past Social History


Smoking Status: Former Smoker





- CARDIAC


Hx Cardiac Disorders: No





- PULMONARY


Hx Respiratory Disorders: No





- NEUROLOGICAL


Hx Neurological Disorder: No





- HEENT


Hx HEENT Problems: No





- RENAL


Hx Chronic Kidney Disease: No





- ENDOCRINE/METABOLIC


Hx Endocrine Disorders: No





- HEMATOLOGICAL/ONCOLOGICAL


Hx Blood Disorders: No





- INTEGUMENTARY


Hx Dermatological Problems: No





- MUSCULOSKELETAL/RHEUMATOLOGICAL


Hx Musculoskeletal Disorders: No


Hx Falls: No





- GASTROINTESTINAL


Hx Gastrointestinal Disorders: No





- GENITOURINARY/GYNECOLOGICAL


Hx Genitourinary Disorders: No





- PSYCHIATRIC


Hx Psychophysiologic Disorder: No


Hx Substance Use: No





- SURGICAL HISTORY


Hx Surgeries: Yes


Hx  Section: Yes (x2)


Other/Comment: gastric baloon 17.  Ene Cohen 3 yrs ago





- ANESTHESIA


Hx Anesthesia: Yes


Hx Anesthesia Reactions: No





Meds


Allergies/Adverse Reactions: 


 Allergies











Allergy/AdvReac Type Severity Reaction Status Date / Time


 


No Known Allergies Allergy   Verified 17 18:57














- Medications


Medications: 


 Current Medications





Cyclobenzaprine HCl (Flexeril)  10 mg PO TID [facility]


   Last Admin: 10/01/17 13:46 Dose:  10 mg


Diazepam (Valium)  5 mg PO Q4 PRN


   PRN Reason: Sleep


Famotidine (Pepcid)  20 mg IVP Q12 [facility]


   Last Admin: 10/01/17 13:47 Dose:  Not Given


Hyoscyamine (Levsin)  0.125 mg PO Q4 [facility]


   Last Admin: 10/01/17 13:47 Dose:  0.125 mg


Naproxen (Naproxen)  500 mg PO BID PRN


   PRN Reason: Pain, moderate (4-7)


   Last Admin: 10/01/17 13:46 Dose:  500 mg


Nitrofurantoin Macrocrystals (Macrobid)  100 mg PO BID [facility]


   Last Admin: 10/01/17 10:20 Dose:  100 mg


Ondansetron HCl (Zofran Inj)  4 mg IVP Q6 PRN


   PRN Reason: Nausea/Vomiting


   Last Admin: 10/01/17 04:15 Dose:  4 mg


Pantoprazole Sodium (Protonix Ec Tab)  40 mg PO DAILY [facility]


   Last Admin: 10/01/17 10:22 Dose:  40 mg


Tramadol HCl (Ultram)  50 mg PO TID [facility]


   Last Admin: 10/01/17 13:48 Dose:  Not Given











Physical Exam





- Head Exam


Head Exam: ATRAUMATIC, NORMOCEPHALIC





- Eye Exam


Eye Exam: Normal appearance





- ENT Exam


ENT Exam: Mucous Membranes Moist, Normal Exam





- Respiratory Exam


Respiratory Exam: Clear to Auscultation Bilateral, NORMAL BREATHING PATTERN.  

absent: Rales, Rhonchi, Wheezes, Respiratory Distress





- Cardiovascular Exam


Cardiovascular Exam: REGULAR RHYTHM, +S1, +S2





- GI/Abdominal Exam


GI & Abdominal Exam: Normal Bowel Sounds, Soft, Tenderness (diffuse).  absent: 

Organomegaly, Rebound, Rigid





- Extremities Exam


Extremities exam: Negative for: joint swelling, pedal edema





- Neurological Exam


Neurological exam: Alert, Oriented x3





- Psychiatric Exam


Psychiatric exam: Normal Affect, Normal Mood





- Skin


Skin Exam: Dry, Intact, Normal Color, Warm





Results





- Vital Signs


Recent Vital Signs: 


 Last Vital Signs











Temp  98.1 F   10/01/17 07:48


 


Pulse  68   10/01/17 07:48


 


Resp  19   10/01/17 07:48


 


BP  165/93 H  10/01/17 07:48


 


Pulse Ox  98   10/01/17 07:48














- Labs


Result Diagrams: 


 17 11:00





 17 11:00


Labs: 


 Laboratory Results - last 24 hr











  10/01/17





  08:30


 


Vitamin B12  481


 


TSH 3rd Generation  1.06














Assessment & Plan





- Assessment and Plan (Free Text)


Assessment: 


Cortney Alvarado is a 42F with no sig procedure is POD #2 gastric balloon vs 

remodeling who presents with intractable nausea, vomiting, and abd pain. CT abd 

does not reveal a GOO and that the device is intact. General surgery contacted 

Dr. Sullivan and states that the symtoms are normal post op. 





1. intractable nausea/vomiting/abd pain 


2. S/P gastric balloon vs remodeling surgery for weight loss





Plan: 


-no clear etiology for severe symptoms


-advance diet as tolerated


-agree with general surgery, recommend follow-up with Dr. Sullivan 


-recommend d/c with zofran 


-Recommend pt contact Dr. Sullivan for follow-up


-follow post diet instructions


-cannot offer any GI interventions at this time


-GI px


-DVT px





D/W Dr. Colón








<[facility] - Last Filed: 10/01/17 17:57>





Meds





- Medications


Medications: 


 Current Medications





Cyclobenzaprine HCl (Flexeril)  10 mg PO TID [facility]


   Last Admin: 10/01/17 17:24 Dose:  10 mg


Diazepam (Valium)  5 mg PO Q4 PRN


   PRN Reason: Sleep


Famotidine (Pepcid)  20 mg IVP Q12 [facility]


   Last Admin: 10/01/17 13:47 Dose:  Not Given


Hyoscyamine (Levsin)  0.125 mg PO Q4 [facility]


   Last Admin: 10/01/17 17:24 Dose:  0.125 mg


Naproxen (Naproxen)  500 mg PO BID PRN


   PRN Reason: Pain, moderate (4-7)


   Last Admin: 10/01/17 13:46 Dose:  500 mg


Nitrofurantoin Macrocrystals (Macrobid)  100 mg PO BID [facility]


   Last Admin: 10/01/17 17:24 Dose:  100 mg


Ondansetron HCl (Zofran Inj)  4 mg IVP Q6 PRN


   PRN Reason: Nausea/Vomiting


   Last Admin: 10/01/17 04:15 Dose:  4 mg


Pantoprazole Sodium (Protonix Ec Tab)  40 mg PO DAILY [facility]


   Last Admin: 10/01/17 10:22 Dose:  40 mg


Tramadol HCl (Ultram)  50 mg PO TID [facility]


   Last Admin: 10/01/17 17:27 Dose:  50 mg











Results





- Vital Signs


Recent Vital Signs: 


 Last Vital Signs











Temp  98.6 F   10/01/17 16:26


 


Pulse  70   10/01/17 16:26


 


Resp  20   10/01/17 16:26


 


BP  165/93 H  10/01/17 07:48


 


Pulse Ox  98   10/01/17 16:26














- Labs


Result Diagrams: 


 17 11:00





 17 11:00


Labs: 


 Laboratory Results - last 24 hr











  10/01/17





  08:30


 


Vitamin B12  481


 


TSH 3rd Generation  1.06














Attending/Attestation





- Attestation


I have personally seen and examined this patient.: Yes


I have fully participated in the care of the patient.: Yes


I have reviewed all pertinent clinical information: Yes


Notes (Text): 





10/01/17 17:54


Patient seen and examined at bedside with GI fellow. This is a 42 yr old F with 

gastric balloon 2 days ago who now presents with intractable nausea, vomiting, 

and abdominal pain. CT abdomen does not reveal outlet obstruction with normal 

lumen. General surgery contacted Dr. Sullivan and states that the symptoms are 

normal post op. Patient was adviced to follow with her gastric surgeon as this 

may happen due to hyperinflatin. No GI intervention required. Supportive care. 

PPi daily. Anti emetics as needed. Will sign off. Thank you for letting us 

participate in the care of your patient

## 2017-10-01 NOTE — HP
CHIEF COMPLAINT:  Abdominal pain.



HISTORY OF PRESENT ILLNESS:  This is a 42-year-old female who recently

underwent gastric balloon placement for weight loss procedure the day

before admission and started having abdominal pain, so the patient was

brought to the emergency room and was admitted for further management.



REVIEW OF SYSTEMS:  Positive for not feeling well and abdominal pain. 

Review of systems otherwise is negative for headache, dizziness, syncope,

loss of consciousness, chest pain, shortness of breath, nausea, vomiting,

diarrhea, constipation, any knee joint or extremity pain.  Review of

systems of all other organ system are unremarkable.



PAST MEDICAL HISTORY:  Significant for morbid obesity.



PAST SURGICAL HISTORY:  Remarkable for recent gastric balloon placement.



PERSONAL HISTORY:  The patient is currently a nonsmoker, nondrinker.  No

substance abuse.



MEDICATIONS:  The patient is on Macrobid, Flexeril, prednisolone and

Tramadol.



ALLERGIES:  The patient is not allergic to any medications.



FAMILY HISTORY:  Noncontributory.



PHYSICAL EXAMINATION

GENERAL:   Well-built, well-nourished, morbidly obese 42-year-old female,

in no acute distress.

VITAL SIGNS:  Temperature 98.4, pulse 77, respiration 19, blood pressure

147/82.

HEENT:  Pupils reacting to light.  Normocephalic and atraumatic skull.

NECK:  No JVD.  No thyromegaly.  No lymphadenopathy.  No nystagmus.

HEART:  S1 and S2, normal and regular.  No significant murmur, gallop or

rub is heard.

LUNGS:  Shows good bilateral air entry.  No rales or rhonchi.

ABDOMEN:  The patient has a very sensitive abdomen, but there is no real

guarding, rigidity or rebound.  Bowel sounds are present and normal.  The

patient is very sensitive on touch and questionable tenderness in the

epigastric region.

EXTREMITIES:  No edema.  No calf swelling.  No tenderness.  No acute

ischemia.

CENTRAL NERVOUS SYSTEM:  Essentially unchanged.



DIAGNOSTIC DATA:  Available diagnostic data reviewed.  WBC is 24.9,

hemoglobin 14.4, hematocrit 42.8, platelets 462.   The pH 7.39, PCO2 of 44,

PO2 of 57 - this is venous blood gas.  Sodium 140, potassium 5.0, chloride

100, bicarb of 24, BUN 12, creatinine 0.5.  SMA-12 is essentially

unremarkable.  Glucose is 130.  Urinalysis is negative.  Abdominal CAT scan

does not reveal any acute pathology.  Of interest, it does not even mention

the gastric balloon.  Abdominal x-ray is negative.



ADMITTING IMPRESSION:  Leukocytosis - questionable occult infection, and

morbid obesity.



PLAN:  As ordered.





__________________________________________

Enmanuel Lord MD





DD:  10/01/2017 7:22:22

DT:  10/01/2017 11:05:25

Job # [id number]

## 2017-10-02 VITALS — DIASTOLIC BLOOD PRESSURE: 92 MMHG | SYSTOLIC BLOOD PRESSURE: 160 MMHG

## 2017-10-02 VITALS — HEART RATE: 60 BPM

## 2017-10-02 VITALS — TEMPERATURE: 98 F

## 2017-10-02 LAB
ALBUMIN/GLOB SERPL: 1.1 {RATIO} (ref 1–2.1)
ALP SERPL-CCNC: 115 U/L (ref 38–126)
ALT SERPL-CCNC: 74 U/L (ref 9–52)
AST SERPL-CCNC: 53 U/L (ref 14–36)
BILIRUB SERPL-MCNC: 0.8 MG/DL (ref 0.2–1.3)
BUN SERPL-MCNC: 8 MG/DL (ref 7–17)
CALCIUM SERPL-MCNC: 9.4 MG/DL (ref 8.4–10.2)
CHLORIDE SERPL-SCNC: 99 MMOL/L (ref 98–107)
CHOLEST SERPL-MCNC: 178 MG/DL (ref 0–199)
CO2 SERPL-SCNC: 26 MMOL/L (ref 22–30)
ERYTHROCYTE [DISTWIDTH] IN BLOOD BY AUTOMATED COUNT: 13.6 % (ref 11.5–14.5)
GLOBULIN SER-MCNC: 3.6 GM/DL (ref 2.2–3.9)
GLUCOSE SERPL-MCNC: 125 MG/DL (ref 65–105)
HCT VFR BLD CALC: 40.9 % (ref 34–47)
MCH RBC QN AUTO: 28.8 PG (ref 27–31)
MCHC RBC AUTO-ENTMCNC: 33.7 G/DL (ref 33–37)
MCV RBC AUTO: 85.4 FL (ref 81–99)
PLATELET # BLD: 431 K/UL (ref 130–400)
POTASSIUM SERPL-SCNC: 3.7 MMOL/L (ref 3.6–5)
PROT SERPL-MCNC: 7.6 G/DL (ref 6.3–8.2)
SODIUM SERPL-SCNC: 139 MMOL/L (ref 132–148)
WBC # BLD AUTO: 15.2 K/UL (ref 4.8–10.8)

## 2017-10-02 RX ADMIN — HYOSCYAMINE SULFATE SCH MG: 0.12 TABLET ORAL at 13:07

## 2017-10-02 RX ADMIN — HYOSCYAMINE SULFATE SCH MG: 0.12 TABLET ORAL at 00:35

## 2017-10-02 RX ADMIN — HYOSCYAMINE SULFATE SCH MG: 0.12 TABLET ORAL at 06:03

## 2017-10-02 RX ADMIN — HYOSCYAMINE SULFATE SCH MG: 0.12 TABLET ORAL at 09:18

## 2017-10-02 RX ADMIN — PANTOPRAZOLE SODIUM SCH MG: 40 TABLET, DELAYED RELEASE ORAL at 09:19

## 2017-10-02 NOTE — CP.PCM.PCO
Assessment/Plan





- Assessment/Plan


Assessment (Free Text): 





Pt stable, denies headache, cp, sob, f/c/n/v. Pt states she feels better. Pt 

noted to have elevated BP, pt states she does not have hx of HTN. Pt made aware 

to f/u with PMD within 1 week to f/u on BP. Pt states she goes to the clinic 

and will f/u. Rx Norvasc x 2 weeks supply given. Pt aware and understands to f/

u with PMD. Pt may resume rest of home meds and to f/u with own surgeon.

## 2017-10-02 NOTE — PN
DATE:  10/02/2017



SUBJECTIVE:  The patient seen and examined.  Interim events noted. 

Consults noted and appreciated.  Gastroenterology and Surgery followup and

intervention noted and appreciated.  The patient feels a little better,

being now able to _____ and tolerate food.  No chest pain.  No shortness of

breath.



PHYSICAL EXAMINATION

GENERAL:  The patient is in no acute distress.

VITAL SIGNS:  Stable.

HEART:  S1 and S2, normal and regular.

LUNGS:  Good bilateral air exchange.

ABDOMEN:  The patient did have some sensitivity in abdomen, but no sign of

acute abdomen.  No guarding, no rigidity, no rebound.

EXTREMITIES:  No edema.  No calf swelling.  No tenderness.  No acute

ischemia.

CENTRAL NERVOUS SYSTEM:  Essentially unchanged.



DIAGNOSTIC DATA:  Available diagnostic data reviewed.



ASSESSMENT AND PLAN:  Overall, the patient is medically stable.  I will

observe the patient and if remains stable, I will discharge the patient

home.  Case and plan discussed with the patient.





__________________________________________

Enmanuel Lord MD



DD:  10/02/2017 7:56:24

DT:  10/02/2017 11:23:27

Job # [id number]

## 2017-11-24 ENCOUNTER — HOSPITAL ENCOUNTER (INPATIENT)
Dept: HOSPITAL 14 - H.ER | Age: 42
LOS: 2 days | Discharge: HOME | DRG: 188 | End: 2017-11-26
Attending: FAMILY MEDICINE | Admitting: FAMILY MEDICINE
Payer: MEDICAID
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
## 2017-11-24 DIAGNOSIS — I10: ICD-10-CM

## 2017-11-24 DIAGNOSIS — E66.9: ICD-10-CM

## 2017-11-24 DIAGNOSIS — R10.84: ICD-10-CM

## 2017-11-24 DIAGNOSIS — Z87.891: ICD-10-CM

## 2017-11-24 DIAGNOSIS — E87.6: ICD-10-CM

## 2017-11-24 DIAGNOSIS — D72.829: ICD-10-CM

## 2017-11-24 DIAGNOSIS — Z98.84: ICD-10-CM

## 2017-11-24 DIAGNOSIS — E86.0: ICD-10-CM

## 2017-11-24 DIAGNOSIS — K95.89: Primary | ICD-10-CM

## 2017-11-24 DIAGNOSIS — N39.0: ICD-10-CM

## 2017-11-24 LAB
ALBUMIN/GLOB SERPL: 1.1 {RATIO} (ref 1–2.1)
ALP SERPL-CCNC: 106 U/L (ref 38–126)
ALT SERPL-CCNC: 41 U/L (ref 9–52)
AST SERPL-CCNC: 27 U/L (ref 14–36)
BACTERIA #/AREA URNS HPF: (no result) /[HPF]
BACTERIA #/AREA URNS HPF: (no result) /[HPF]
BASOPHILS # BLD AUTO: 0.1 K/UL (ref 0–0.2)
BASOPHILS NFR BLD: 1 % (ref 0–2)
BILIRUB SERPL-MCNC: 0.3 MG/DL (ref 0.2–1.3)
BILIRUB UR-MCNC: NEGATIVE MG/DL
BILIRUB UR-MCNC: NEGATIVE MG/DL
BUN SERPL-MCNC: 12 MG/DL (ref 7–17)
CALCIUM SERPL-MCNC: 8.9 MG/DL (ref 8.4–10.2)
CHLORIDE SERPL-SCNC: 96 MMOL/L (ref 98–107)
CO2 SERPL-SCNC: 36 MMOL/L (ref 22–30)
COLOR UR: YELLOW
COLOR UR: YELLOW
EOSINOPHIL # BLD AUTO: 0.3 K/UL (ref 0–0.7)
EOSINOPHIL NFR BLD: 2.3 % (ref 0–4)
ERYTHROCYTE [DISTWIDTH] IN BLOOD BY AUTOMATED COUNT: 13 % (ref 11.5–14.5)
GLOBULIN SER-MCNC: 3.4 GM/DL (ref 2.2–3.9)
GLUCOSE SERPL-MCNC: 128 MG/DL (ref 65–105)
GLUCOSE UR STRIP-MCNC: (no result) MG/DL
GLUCOSE UR STRIP-MCNC: (no result) MG/DL
HCT VFR BLD CALC: 37.4 % (ref 34–47)
KETONES UR STRIP-MCNC: NEGATIVE MG/DL
KETONES UR STRIP-MCNC: NEGATIVE MG/DL
LEUKOCYTE ESTERASE UR-ACNC: (no result) LEU/UL
LEUKOCYTE ESTERASE UR-ACNC: (no result) LEU/UL
LIPASE SERPL-CCNC: 180 U/L (ref 23–300)
LYMPHOCYTES # BLD AUTO: 2.1 K/UL (ref 1–4.3)
LYMPHOCYTES NFR BLD AUTO: 13.8 % (ref 20–40)
MCH RBC QN AUTO: 28.7 PG (ref 27–31)
MCHC RBC AUTO-ENTMCNC: 33.7 G/DL (ref 33–37)
MCV RBC AUTO: 85.2 FL (ref 81–99)
MONOCYTES # BLD: 0.8 K/UL (ref 0–0.8)
MONOCYTES NFR BLD: 5 % (ref 0–10)
NEUTROPHILS # BLD: 11.8 K/UL (ref 1.8–7)
NEUTROPHILS NFR BLD AUTO: 77.9 % (ref 50–75)
NRBC BLD AUTO-RTO: 0.1 % (ref 0–0)
PH UR STRIP: 7 [PH] (ref 5–8)
PH UR STRIP: 7 [PH] (ref 5–8)
PLATELET # BLD: 491 K/UL (ref 130–400)
PMV BLD AUTO: 9.2 FL (ref 7.2–11.7)
POTASSIUM SERPL-SCNC: 3.3 MMOL/L (ref 3.6–5)
PROT SERPL-MCNC: 7.1 G/DL (ref 6.3–8.2)
PROT UR STRIP-MCNC: 100 MG/DL
PROT UR STRIP-MCNC: NEGATIVE MG/DL
RBC # UR STRIP: NEGATIVE /UL
RBC # UR STRIP: NEGATIVE /UL
RBC #/AREA URNS HPF: 2 /HPF (ref 0–3)
RBC #/AREA URNS HPF: 3 /HPF (ref 0–3)
SODIUM SERPL-SCNC: 139 MMOL/L (ref 132–148)
SP GR UR STRIP: 1.01 (ref 1–1.03)
SP GR UR STRIP: 1.01 (ref 1–1.03)
UROBILINOGEN UR-MCNC: (no result) MG/DL (ref 0.2–1)
UROBILINOGEN UR-MCNC: (no result) MG/DL (ref 0.2–1)
WBC # BLD AUTO: 15.2 K/UL (ref 4.8–10.8)
WBC #/AREA URNS HPF: 10 /HPF (ref 0–5)
WBC #/AREA URNS HPF: 3 /HPF (ref 0–5)

## 2017-11-24 NOTE — CARD
--------------- APPROVED REPORT --------------





EKG Measurement

Heart [id number]

MD [id number]

[id number]

[id number]

[id number]



<Conclusion>

Normal sinus rhythm

Nonspecific ST abnormality

Abnormal ECG

## 2017-11-24 NOTE — CP.PCM.HP
History of Present Illness





- History of Present Illness


History of Present Illness: 








CC/HPI: Pt. is here today for evaluation of abdominal pain. Pt. states she had 

a reshape weight loss balloon placed endoscopiclly on 17 and ever since 

then has been having abdominal discomfort described as bloating associated with 

nausea and vominting. Pt. states she has not had any vomiting, diarrhea, or 

constipation in the past two days but when she woke up this morning she noted 

that her urine was green. Reshape balloon was placed on 17 at [facility] by Dr. Berry.


Pt. also states has not eaten or drank anything since this morning. 





ROS: Pt. denies any fever, chills, hematemesis, melena, hematochezia, blood per 

rectum, headache, chest pain, dyspnea, dysuria, hematuria, falls, trauma, or 

injury. 





PMHx: HTN diet controlled, Obesity


PSHx: C-sectionx 2, Left lowr limb surgery for Fx in childhood, Endoscopy Re-

shape balloon placement at [facility] on 17, Tummy tuck 


FMHx: Denies FMHx of Cancer


OBHx: LMP two weeks ago, Not using any contraception, 


SHx:   TOB- quit 4 years ago


         ETOH- social


         DRUG- NO 





Allergies: NKDA


Home Meds: None


PMD: [facility]


RX: [facility] 





E.D. Course:


Pepcid 


Morphine


Zofran


CBC, CMP


I.V. Fluids 


CT Abdomen


























Present on Admission





- Present on Admission


Any Indicators Present on Admission: No


History of DVT/PE: No


History of Uncontrolled Diabetes: No


Urinary Catheter: No


Decubitus Ulcer Present: No





Review of Systems





- Constitutional


Constitutional: Anorexia.  absent: Headache





- Cardiovascular


Cardiovascular: absent: Chest Pain, Dyspnea





- Respiratory


Respiratory: absent: Cough, Dyspnea





- Gastrointestinal


Gastrointestinal: As Per HPI, Abdominal Pain





- Genitourinary


Genitourinary: absent: Dysuria, Flank Pain





- Reproductive: Female


Reproductive:Female: As Per HPI





- Musculoskeletal


Musculoskeletal: absent: Abnormal Gait, Arthralgias





- Neurological


Neurological: absent: Abnormal Gait, Confusion





- Psychiatric


Psychiatric: As Per HPI, Change in Appetite





Past Patient History





- Infectious Disease


Hx of Infectious Diseases: None





- Past Medical History & Family History


Past Medical History?: No





- Past Social History


Smoking Status: Former Smoker





- CARDIAC


Hx Hypertension: No





- PULMONARY


Hx Respiratory Disorders: No





- NEUROLOGICAL


Hx Neurological Disorder: No





- HEENT


Hx HEENT Problems: No





- RENAL


Hx Chronic Kidney Disease: No





- ENDOCRINE/METABOLIC


Hx Endocrine Disorders: No





- HEMATOLOGICAL/ONCOLOGICAL


Hx Blood Disorders: No





- INTEGUMENTARY


Hx Dermatological Problems: No





- MUSCULOSKELETAL/RHEUMATOLOGICAL


Hx Musculoskeletal Disorders: No


Hx Falls: No





- GASTROINTESTINAL


Hx Gastrointestinal Disorders: No





- GENITOURINARY/GYNECOLOGICAL


Hx Genitourinary Disorders: No





- PSYCHIATRIC


Hx Psychophysiologic Disorder: No


Hx Substance Use: No





- SURGICAL HISTORY


Hx Surgeries: Yes


Hx  Section: Yes (x2)


Other/Comment: gastric baloon 17.  Tummy Tuck 3 yrs ago





- ANESTHESIA


Hx Anesthesia: Yes


Hx Anesthesia Reactions: No





Meds


Allergies/Adverse Reactions: 


 Allergies











Allergy/AdvReac Type Severity Reaction Status Date / Time


 


No Known Allergies Allergy   Verified 17 18:57














Physical Exam





- Constitutional


Appears: Non-toxic, No Acute Distress





- Eye Exam


Eye Exam: Normal appearance.  absent: Scleral icterus





- ENT Exam


ENT Exam: Mucous Membranes Moist





- Neck Exam


Neck exam: Positive for: Normal Inspection





- Respiratory Exam


Respiratory Exam: Clear to Auscultation Bilateral, NORMAL BREATHING PATTERN





- Cardiovascular Exam


Cardiovascular Exam: REGULAR RHYTHM, +S1, +S2





- GI/Abdominal Exam


GI & Abdominal Exam: Normal Bowel Sounds, Soft, Tenderness (epigastric area )





- Extremities Exam


Extremities exam: Positive for: normal inspection.  Negative for: calf 

tenderness





- Back Exam


Back exam: absent: CVA tenderness (L), CVA tenderness (R)





- Neurological Exam


Neurological exam: Alert, Oriented x3





- Psychiatric Exam


Psychiatric exam: Normal Affect, Normal Mood





Results





- Vital Signs


Recent Vital Signs: 





 Last Vital Signs











Temp  97.5 F L  17 12:27


 


Pulse  79   17 12:27


 


Resp  18   17 12:27


 


BP  136/79   17 13:25


 


Pulse Ox  98   17 10:36














- Labs


Result Diagrams: 


 17 10:52





 17 10:52


Labs: 





 Laboratory Results - last 24 hr











  17





  10:52 10:52 10:52


 


WBC   15.2 H 


 


RBC   4.39 


 


Hgb   12.6 


 


Hct   37.4 


 


MCV   85.2 


 


MCH   28.7 


 


MCHC   33.7 


 


RDW   13.0 


 


Plt Count   491 H 


 


MPV   9.2 


 


Neut % (Auto)   77.9 H 


 


Lymph % (Auto)   13.8 L 


 


Mono % (Auto)   5.0 


 


Eos % (Auto)   2.3 


 


Baso % (Auto)   1.0 


 


Neut #   11.8 H 


 


Lymph #   2.1 


 


Mono #   0.8 


 


Eos #   0.3 


 


Baso #   0.1 


 


Sodium  139  


 


Potassium  3.3 L  


 


Chloride  96 L  


 


Carbon Dioxide  36 H  


 


Anion Gap  10  


 


BUN  12  


 


Creatinine  0.6 L  


 


Est GFR ( Amer)  > 60  


 


Est GFR (Non-Af Amer)  > 60  


 


Random Glucose  128 H  


 


Calcium  8.9  


 


Total Bilirubin  0.3  


 


AST  27  


 


ALT  41  


 


Alkaline Phosphatase  106  


 


Total Protein  7.1  


 


Albumin  3.7  


 


Globulin  3.4  


 


Albumin/Globulin Ratio  1.1  


 


Lipase  180  


 


Urine Color    Yellow


 


Urine Clarity    Cloudy


 


Urine pH    7.0


 


Ur Specific Gravity    1.015


 


Urine Protein    100


 


Urine Glucose (UA)    Neg


 


Urine Ketones    Negative


 


Urine Blood    Negative


 


Urine Nitrate    Negative


 


Urine Bilirubin    Negative


 


Urine Urobilinogen    0.2-1.0


 


Ur Leukocyte Esterase    Small


 


Urine RBC (Auto)    3


 


Urine Microscopic WBC    10 H


 


Ur Squamous Epith Cells    8 H


 


Amorphous Sediment    Rare H


 


Urine Bacteria    Mod H














Assessment & Plan





- Assessment and Plan (Free Text)


Assessment: 

















42 y.o. female s/p Reshape gastric balloon placed on 17 now with rupture.











PROCEDURE:  CT Abdomen and Pelvis without intravenous contrast





IMPRESSION:


Interval collapse of the distal gastric weight loss balloon.


Otherwise, unremarkable CT scan of the abdomen and pelvis.


























Plan: 








42 y.o. female s/p Reshape gastric balloon placed on 17 now with rupture.








Ruptured Re-shape Gastric balloon associated with abdominal pain and poor po 

intake


1- NPO, will advance diet as tolerated


2- I.V. Fluids to be stopped as patient tolerating po 


3- Pain management 


4- Zofran PRN


5- Repeat CBC and CMP in the a.m.


6- UA with UCx 


7- I.V. protonix 40mg 


8- General surgery recommendations








[facility] called at [phone], spoke with Dr. Cheo Pryor who was 

covering for Dr. Richi Berry. Dr. Pryor states patient had an appointment for 

removal of Balloon on Wednesday but Pt. missed appointment. Pt. progress 

discussed with Dr. Pryor and states that pt. can be discharged once tolerating 

PO diet and follow up with [facility] on Monday. Dr. Pryor states that 

appointment will be setup for Pt. on Monday to be contacted by staff from [facility], Pt. also encouraged to call office on Monday. 








DVT prophylaxis


1- SCD





Code status


1- Full code

## 2017-11-24 NOTE — CP.PCM.CON
History of Present Illness





- History of Present Illness


History of Present Illness: 





General Surgery


Dr. Stahl





41 y/o F w/ morbid obesity presents to the ED c/o green urine and PO 

intolerance. Pt is s/p gastric balloon placed endoscopically on  for weight 

loss. Pt states that since placement, she has been unable to tolerate more than 

~2oz of liquid or solid foods. Pt admits to being in [facility] ED last week for 

the same PO intolerance. Pt was seen here in [place] back in September, shortly 

after the balloon was placed for PO intolerance and abd pain. In addition, pt 

reports single episode of green urine this AM of which the pt took a picture. 

Picture showed lime green urine. Pt also reports single episode of dark stool 

last evening. Pt denies F/C, D/C, CP, SOB.





PMHx: obesity


Meds: B12 vitamin


NKDA


PSHx: Abdominoplasty,  x2, LLE fracture repair


SHx: quit smoking 4yrs ago, smoked 1.5ppd x20yrs. occasional EtOH, denies drug 

use


FHx: non-contributory





Review of Systems





- Review of Systems


All systems: reviewed and no additional remarkable complaints except (see HPI)





Past Patient History





- Infectious Disease


Hx of Infectious Diseases: None





- Past Medical History & Family History


Past Medical History?: No





- Past Social History


Smoking Status: Former Smoker





- CARDIAC


Hx Hypertension: No





- PULMONARY


Hx Respiratory Disorders: No





- NEUROLOGICAL


Hx Neurological Disorder: No





- HEENT


Hx HEENT Problems: No





- RENAL


Hx Chronic Kidney Disease: No





- ENDOCRINE/METABOLIC


Hx Endocrine Disorders: No





- HEMATOLOGICAL/ONCOLOGICAL


Hx Blood Disorders: No





- INTEGUMENTARY


Hx Dermatological Problems: No





- MUSCULOSKELETAL/RHEUMATOLOGICAL


Hx Musculoskeletal Disorders: No


Hx Falls: No





- GASTROINTESTINAL


Hx Gastrointestinal Disorders: No





- GENITOURINARY/GYNECOLOGICAL


Hx Genitourinary Disorders: No





- PSYCHIATRIC


Hx Psychophysiologic Disorder: No


Hx Substance Use: No





- SURGICAL HISTORY


Hx Surgeries: Yes


Hx  Section: Yes (x2)


Other/Comment: gastric baloon 17.  Ene Cohen 3 yrs ago





- ANESTHESIA


Hx Anesthesia: Yes


Hx Anesthesia Reactions: No





Meds


Allergies/Adverse Reactions: 


 Allergies











Allergy/AdvReac Type Severity Reaction Status Date / Time


 


No Known Allergies Allergy   Verified 17 18:57














- Medications


Medications: 


 Current Medications





Sodium Chloride (Sodium Chloride 0.9%)  1,000 mls @ 200 mls/hr IV .Q5H STA


   Stop: 17 15:32


   Last Admin: 17 10:58 Dose:  200 mls/hr











Physical Exam





- Constitutional


Appears: Non-toxic, No Acute Distress





- Head Exam


Head Exam: NORMAL INSPECTION





- Eye Exam


Pupil Exam: NORMAL ACCOMODATION





- ENT Exam


ENT Exam: Mucous Membranes Dry





- Respiratory Exam


Respiratory Exam: NORMAL BREATHING PATTERN.  absent: Accessory Muscle Use, 

Respiratory Distress





- Cardiovascular Exam


Cardiovascular Exam: absent: Bradycardia, Tachycardia





- GI/Abdominal Exam


GI & Abdominal Exam: Soft, Tenderness (minimal TTP epigastric/LUQ).  absent: 

Distended, Guarding, Rebound, Rigid


Additional comments: 





umbilical scar present from abdominoplasty





- Extremities Exam


Extremities exam: Positive for: normal inspection





- Neurological Exam


Neurological exam: Alert, Oriented x3





- Psychiatric Exam


Psychiatric exam: Normal Affect, Normal Mood





- Skin


Skin Exam: Dry (cracked, peeling), Intact, Normal Color, Warm





Results





- Vital Signs


Recent Vital Signs: 


 Last Vital Signs











Temp  97.5 F L  17 12:27


 


Pulse  79   17 12:27


 


Resp  18   17 12:27


 


BP  136/79   17 13:25


 


Pulse Ox  98   17 10:36














- Labs


Result Diagrams: 


 17 10:52





 17 10:52


Labs: 


 Laboratory Results - last 24 hr











  17





  10:52 10:52 10:52


 


WBC   15.2 H 


 


RBC   4.39 


 


Hgb   12.6 


 


Hct   37.4 


 


MCV   85.2 


 


MCH   28.7 


 


MCHC   33.7 


 


RDW   13.0 


 


Plt Count   491 H 


 


MPV   9.2 


 


Neut % (Auto)   77.9 H 


 


Lymph % (Auto)   13.8 L 


 


Mono % (Auto)   5.0 


 


Eos % (Auto)   2.3 


 


Baso % (Auto)   1.0 


 


Neut #   11.8 H 


 


Lymph #   2.1 


 


Mono #   0.8 


 


Eos #   0.3 


 


Baso #   0.1 


 


Sodium  139  


 


Potassium  3.3 L  


 


Chloride  96 L  


 


Carbon Dioxide  36 H  


 


Anion Gap  10  


 


BUN  12  


 


Creatinine  0.6 L  


 


Est GFR ( Amer)  > 60  


 


Est GFR (Non-Af Amer)  > 60  


 


Random Glucose  128 H  


 


Calcium  8.9  


 


Total Bilirubin  0.3  


 


AST  27  


 


ALT  41  


 


Alkaline Phosphatase  106  


 


Total Protein  7.1  


 


Albumin  3.7  


 


Globulin  3.4  


 


Albumin/Globulin Ratio  1.1  


 


Lipase  180  


 


Urine Color    Yellow


 


Urine Clarity    Cloudy


 


Urine pH    7.0


 


Ur Specific Gravity    1.015


 


Urine Protein    100


 


Urine Glucose (UA)    Neg


 


Urine Ketones    Negative


 


Urine Blood    Negative


 


Urine Nitrate    Negative


 


Urine Bilirubin    Negative


 


Urine Urobilinogen    0.2-1.0


 


Ur Leukocyte Esterase    Small


 


Urine RBC (Auto)    3


 


Urine Microscopic WBC    10 H


 


Ur Squamous Epith Cells    8 H


 


Amorphous Sediment    Rare H


 


Urine Bacteria    Mod H














- Imaging and Cardiology


  ** CT scan - abdomen


Status: Image reviewed by me, Report reviewed by me





Assessment & Plan





- Assessment and Plan (Free Text)


Assessment: 





41 y/o F w/ dehydration 2/2 PO intolerance 2/2 weight-loss balloon device w/ 

green urine 2/2 balloon rupture





- recommend endoscopic removal of gastric balloon


- replete electrolytes


- IVF


- Pt should tolerate 2oz liquid prior to discharge


- f/u GI recs


- no surgical intervention at this time





Pt discussed w/ Dr. Favio Amezquita DO PGY2

## 2017-11-24 NOTE — ED PDOC
HPI: Abdomen


Time Seen by Provider: 17 10:11


History Per: Patient


Onset/Duration Of Symptoms: Other (6 weeks)


Current Symptoms Are (Timing): Still Present


Severity: Moderate


Pain Scale Rating Of: 4


Location Of Pain/Discomfort: Diffuse


Quality Of Discomfort: Unable To Describe


Associated Symptoms: Nausea, Vomiting, Urinary Symptoms (Green urine).  denies: 

Fever


Exacerbating Factors: Food


Additional Complaint(s): 





generalized abd pain assoc with vomiting x 6 weeks. S/p gastric baloon , 

has had pain since then. Unable to tolerater Po. No vomiting yesterday. no 

fever. Noticed green urine yesterday. No bloody or melenotic stools.





Past Medical History


Vital Signs: 


 Last Vital Signs











Temp  97.5 F L  17 10:36


 


Pulse  79   17 10:36


 


Resp  18   17 10:36


 


BP  172/97 H  17 10:36


 


Pulse Ox  98   17 10:36














- Medical History


PMH: 


   Denies: HTN, Chronic Kidney Disease





- Surgical History


Surgical History:  ( x 2)


Other surgeries: s/p gastric baloon





- Family History


Family History: States: Unknown Family Hx, Hypertension (mother )





- Home Medications


Home Medications: 


 Ambulatory Orders











 Medication  Instructions  Recorded


 


Nitrofurantoin Macrocrystals 100 mg PO BID #14 cap 16





[Macrobid]  


 


Naproxen [Naprosyn] 500 mg PO BID PRN #20 tablet 12/10/16


 


Cyclobenzaprine [Flexeril] 10 mg PO TID #20 tab 17


 


Methylprednisolone [Medrol Dose 4 mg PO DAILY #21 mg 17





Pack (21 tabs)]  


 


traMADol [Ultram] 50 mg PO TID #10 tab 17


 


Hyoscyamine [Levsin] 0.125 mg PO Q4 17


 


Ondansetron ODT [Zofran ODT] 4 mg PO Q4 17


 


Pantoprazole [Protonix EC Tab] 40 ng PO DAILY 17


 


diaZEpam [Valium] 5 mg PO Q4 PRN 17


 


amLODIPine [Norvasc] 5 mg PO DAILY #14 tab 10/02/17














- Allergies


Allergies/Adverse Reactions: 


 Allergies











Allergy/AdvReac Type Severity Reaction Status Date / Time


 


No Known Allergies Allergy   Verified 17 18:57














Review of Systems


ROS Statement: Except As Marked, All Systems Reviewed And Found Negative


Gastrointestinal: Positive for: Vomiting, Abdominal Pain





Physical Exam





- Reviewed


Nursing Documentation Reviewed: Yes


Vital Signs Reviewed: Yes





- Physical Exam


Appears: Positive for: Non-toxic, Uncomfortable


Head Exam: Positive for: ATRAUMATIC, NORMAL INSPECTION, NORMOCEPHALIC


Skin: Positive for: Normal Color, Warm, DRY


Eye Exam: Positive for: EOMI, Normal appearance, PERRL


ENT: Positive for: Normal ENT Inspection


Neck: Positive for: Normal, Painless ROM


Cardiovascular/Chest: Positive for: Regular Rate, Rhythm


Respiratory: Positive for: CNT, Normal Breath Sounds


Gastrointestinal/Abdominal: Positive for: Bowel Sounds, Soft, Tenderness (Mild 

diffuse tenderness)


Back: Positive for: Normal Inspection


Extremity: Positive for: Normal ROM


Neurologic/Psych: Positive for: Alert, Oriented





- Laboratory Results


Result Diagrams: 


 17 10:52





 17 10:52





Disposition





- Clinical Impression


Clinical Impression: 


 Abdominal pain








- Patient ED Disposition


Is Patient to be Admitted: Yes





- Disposition


Disposition Time: 12:13


Condition: FAIR





- Pt Status Changed To:


Hospital Disposition Of: Observation





- POA


Present On Arrival: None

## 2017-11-24 NOTE — CT
PROCEDURE:  CT Abdomen and Pelvis without intravenous contrast



HISTORY:

abd pain



COMPARISON:

CT scan of the abdomen and pelvis dated 09/30/2017



TECHNIQUE:

Without contrast.. 



Radiation dose:



Total exam DLP = 1120 mGy-cm.



This CT exam was performed using one or more of the following dose 

reduction techniques: Automated exposure control, adjustment of the 

mA and/or kV according to patient size, and/or use of iterative 

reconstruction technique.



FINDINGS:



LOWER THORAX:

Unremarkable. 



LIVER:

Unremarkable. No gross lesion or ductal dilatation.  



GALLBLADDER AND BILE DUCTS:

Unremarkable. 



PANCREAS:

Unremarkable. No gross lesion or ductal dilatation.



SPLEEN:

Unremarkable. 



ADRENALS:

Unremarkable. No mass. 



KIDNEYS AND URETERS:

Unremarkable. No hydronephrosis. No solid mass. 



VASCULATURE:

Unremarkable. No aortic aneurysm. 



BOWEL:

Stomach distended with weight loss gastric balloon. Distal balloon 

has collapsed. No obstruction. No gross mural thickening. 



APPENDIX:

Unremarkable. Normal appendix. 



PERITONEUM:

Small volume pelvic free fluid. No free air. 



LYMPH NODES:

Unremarkable. No enlarged lymph nodes. 



BLADDER:

Unremarkable. 



REPRODUCTIVE:

Unremarkable. 



BONES:

No acute fracture. 



OTHER FINDINGS:

None.



IMPRESSION:

Interval collapse of the distal gastric weight loss balloon.



Otherwise, unremarkable CT scan of the abdomen and pelvis.



Findings conveyed to Dr. Cardoza by Dr. Leslie at 12:00 pm on 

11/24/2017.

## 2017-11-25 LAB
BUN SERPL-MCNC: 5 MG/DL (ref 7–17)
CALCIUM SERPL-MCNC: 8.3 MG/DL (ref 8.4–10.2)
CHLORIDE SERPL-SCNC: 103 MMOL/L (ref 98–107)
CO2 SERPL-SCNC: 30 MMOL/L (ref 22–30)
ERYTHROCYTE [DISTWIDTH] IN BLOOD BY AUTOMATED COUNT: 13.4 % (ref 11.5–14.5)
GLUCOSE SERPL-MCNC: 110 MG/DL (ref 65–105)
HCT VFR BLD CALC: 34.8 % (ref 34–47)
MCH RBC QN AUTO: 28.2 PG (ref 27–31)
MCHC RBC AUTO-ENTMCNC: 32.8 G/DL (ref 33–37)
MCV RBC AUTO: 86 FL (ref 81–99)
PLATELET # BLD: 447 K/UL (ref 130–400)
POTASSIUM SERPL-SCNC: 2.9 MMOL/L (ref 3.6–5)
SODIUM SERPL-SCNC: 139 MMOL/L (ref 132–148)
WBC # BLD AUTO: 13.3 K/UL (ref 4.8–10.8)

## 2017-11-25 RX ADMIN — POTASSIUM CHLORIDE SCH MLS/HR: 10 INJECTION, SOLUTION INTRAVENOUS at 10:49

## 2017-11-25 RX ADMIN — POTASSIUM CHLORIDE SCH MLS/HR: 10 INJECTION, SOLUTION INTRAVENOUS at 11:44

## 2017-11-25 NOTE — CP.PCM.DIS
Provider





- Provider


Date of Admission: 


11/24/17 12:12





Attending physician: 


Alexandra Ruth MD








Hospital Course





- Lab Results


Lab Results: 


 Most Recent Lab Values











WBC  13.3 K/uL (4.8-10.8)  H  11/25/17  05:30    


 


RBC  4.04 Mil/uL (3.80-5.20)   11/25/17  05:30    


 


Hgb  11.4 g/dL (12.0-16.0)  L  11/25/17  05:30    


 


Hct  34.8 % (34.0-47.0)   11/25/17  05:30    


 


MCV  86.0 fl (81.0-99.0)   11/25/17  05:30    


 


MCH  28.2 pg (27.0-31.0)   11/25/17  05:30    


 


MCHC  32.8 g/dL (33.0-37.0)  L  11/25/17  05:30    


 


RDW  13.4 % (11.5-14.5)   11/25/17  05:30    


 


Plt Count  447 K/uL (130-400)  H  11/25/17  05:30    


 


MPV  9.2 fl (7.2-11.7)   11/24/17  10:52    


 


Neut % (Auto)  77.9 % (50.0-75.0)  H  11/24/17  10:52    


 


Lymph % (Auto)  13.8 % (20.0-40.0)  L  11/24/17  10:52    


 


Mono % (Auto)  5.0 % (0.0-10.0)   11/24/17  10:52    


 


Eos % (Auto)  2.3 % (0.0-4.0)   11/24/17  10:52    


 


Baso % (Auto)  1.0 % (0.0-2.0)   11/24/17  10:52    


 


Neut #  11.8 K/uL (1.8-7.0)  H  11/24/17  10:52    


 


Lymph #  2.1 K/uL (1.0-4.3)   11/24/17  10:52    


 


Mono #  0.8 K/uL (0.0-0.8)   11/24/17  10:52    


 


Eos #  0.3 K/uL (0.0-0.7)   11/24/17  10:52    


 


Baso #  0.1 K/uL (0.0-0.2)   11/24/17  10:52    


 


Sodium  139 mmol/l (132-148)   11/25/17  05:30    


 


Potassium  2.9 MMOL/L (3.6-5.0)  L  11/25/17  05:30    


 


Chloride  103 mmol/L ()   11/25/17  05:30    


 


Carbon Dioxide  30 mmol/L (22-30)   11/25/17  05:30    


 


Anion Gap  9  (10-20)  L  11/25/17  05:30    


 


BUN  5 mg/dl (7-17)  L  11/25/17  05:30    


 


Creatinine  0.6 mg/dl (0.7-1.2)  L  11/25/17  05:30    


 


Est GFR ( Amer)  > 60   11/25/17  05:30    


 


Est GFR (Non-Af Amer)  > 60   11/25/17  05:30    


 


Random Glucose  110 mg/dL ()  H  11/25/17  05:30    


 


Calcium  8.3 mg/dL (8.4-10.2)  L  11/25/17  05:30    


 


Total Bilirubin  0.3 mg/dl (0.2-1.3)   11/24/17  10:52    


 


AST  27 U/L (14-36)   11/24/17  10:52    


 


ALT  41 U/L (9-52)   11/24/17  10:52    


 


Alkaline Phosphatase  106 U/L ()   11/24/17  10:52    


 


Total Protein  7.1 G/DL (6.3-8.2)   11/24/17  10:52    


 


Albumin  3.7 g/dL (3.5-5.0)   11/24/17  10:52    


 


Globulin  3.4 gm/dL (2.2-3.9)   11/24/17  10:52    


 


Albumin/Globulin Ratio  1.1  (1.0-2.1)   11/24/17  10:52    


 


Lipase  180 U/L ()   11/24/17  10:52    


 


Urine Color  Yellow  (YELLOW)   11/24/17  18:56    


 


Urine Clarity  Clear  (Clear)   11/24/17  18:56    


 


Urine pH  7.0  (5.0-8.0)   11/24/17  18:56    


 


Ur Specific Gravity  1.010  (1.003-1.030)   11/24/17  18:56    


 


Urine Protein  Negative mg/dL (NEGATIVE)   11/24/17  18:56    


 


Urine Glucose (UA)  Neg mg/dL (Normal)   11/24/17  18:56    


 


Urine Ketones  Negative mg/dL (NEGATIVE)   11/24/17  18:56    


 


Urine Blood  Negative  (NEGATIVE)   11/24/17  18:56    


 


Urine Nitrate  Negative  (NEGATIVE)   11/24/17  18:56    


 


Urine Bilirubin  Negative  (NEGATIVE)   11/24/17  18:56    


 


Urine Urobilinogen  0.2-1.0 mg/dL (0.2-1.0)   11/24/17  18:56    


 


Ur Leukocyte Esterase  Neg Sina/uL (Negative)   11/24/17  18:56    


 


Urine RBC (Auto)  2 /hpf (0-3)   11/24/17  18:56    


 


Urine Microscopic WBC  3 /hpf (0-5)   11/24/17  18:56    


 


Ur Squamous Epith Cells  1 /hpf (0-5)   11/24/17  18:56    


 


Amorphous Sediment  Rare /ul (<OCC)  H  11/24/17  10:52    


 


Urine Bacteria  Rare  (<OCC)   11/24/17  18:56    














Discharge Exam





- Head Exam


Head Exam: NORMAL INSPECTION





Discharge Plan





- Discharge Medications


Prescriptions: 


Ondansetron ODT [Zofran ODT] 4 mg PO Q6 PRN 7 Days #10 odt


 PRN Reason: Nausea/Vomiting


Pantoprazole Sodium [Protonix] 40 mg PO DAILY #7 ect





- Follow Up Plan


Condition: FAIR


Disposition: HOME/ ROUTINE

## 2017-11-25 NOTE — CP.PCM.PN
Subjective





- Date & Time of Evaluation


Date of Evaluation: 11/25/17


Time of Evaluation: 08:39





- Subjective


Subjective: 





Gen Sx:  Dr Stahl





Pt S&E.  NAEO.  Reports feeling much better.  Denies N/V.  Tolerating PO 

intake.  Plans for f/u with original bariatric surgeon on monday





Objective





- Vital Signs/Intake and Output


Vital Signs (last 24 hours): 


 











Temp Pulse Resp BP Pulse Ox


 


 97.8 F   75   20   138/95 H  100 


 


 11/24/17 15:55  11/25/17 08:19  11/25/17 08:19  11/25/17 08:19  11/25/17 08:19











- Medications


Medications: 


 Current Medications





Hydromorphone HCl (Dilaudid)  0.5 mg IVP Q6H PRN


   PRN Reason: Pain, severe (8-10)


   Stop: 11/26/17 20:19


   Last Admin: 11/25/17 02:23 Dose:  0.5 mg


Ondansetron HCl (Zofran Inj)  4 mg IVP Q6 PRN


   PRN Reason: Nausea/Vomiting


Pantoprazole Sodium (Protonix Inj)  40 mg IVP DAILY MISBAH











- Labs


Labs: 


 





 11/25/17 05:30 





 11/25/17 05:30 











- Constitutional


Appears: Non-toxic, No Acute Distress





- ENT Exam


ENT Exam: Mucous Membranes Moist





- Respiratory Exam


Respiratory Exam: absent: Accessory Muscle Use, Respiratory Distress





- Cardiovascular Exam


Cardiovascular Exam: REGULAR RHYTHM





- GI/Abdominal Exam


GI & Abdominal Exam: Soft.  absent: Distended, Tenderness





- Neurological Exam


Neurological Exam: Alert, Awake, Oriented x3





Assessment and Plan





- Assessment and Plan (Free Text)


Assessment: 





42F with ruptured gastric balloon


Plan: 





symptoms resolved


pt tolerating PO


clear for d/c with follow up with original surgeon





d/w Dr Favio Madera, PGY3

## 2017-11-25 NOTE — CP.PCM.PN
Subjective





- Date & Time of Evaluation


Date of Evaluation: 11/25/17


Time of Evaluation: 10:15





- Subjective


Subjective: 








Pt. seen at bedside this morning. Pt. denies any complaints. Overnight events 

reviewed and uneventful. Pt. now tolerating regular diet. On ROS, pt. denies 

any nausea, vomiting, diarrhea, constipation, fever, chills, hematemesis, melena

, or hematochezia. 


Pt. denies any cough.














Objective





- Vital Signs/Intake and Output


Vital Signs (last 24 hours): 


 











Temp Pulse Resp BP Pulse Ox


 


 97.8 F   75   20   138/95 H  100 


 


 11/24/17 15:55  11/25/17 08:19  11/25/17 08:19  11/25/17 08:19  11/25/17 08:19











- Medications


Medications: 


 Current Medications





Lisinopril (Zestril)  5 mg PO DAILY [facility]


Ondansetron HCl (Zofran Inj)  4 mg IVP Q6 PRN


   PRN Reason: Nausea/Vomiting


Pantoprazole Sodium (Protonix Inj)  40 mg IVP DAILY [facility]


   Last Admin: 11/25/17 09:00 Dose:  40 mg











- Labs


Labs: 


 





 11/25/17 05:30 





 11/25/17 12:00 











- Constitutional


Appears: Non-toxic, No Acute Distress





- Eye Exam


Eye Exam: Normal appearance.  absent: Scleral icterus





- ENT Exam


ENT Exam: Mucous Membranes Moist





- Respiratory Exam


Respiratory Exam: Clear to Ausculation Bilateral, NORMAL BREATHING PATTERN





- Cardiovascular Exam


Cardiovascular Exam: REGULAR RHYTHM, +S1, +S2





- GI/Abdominal Exam


GI & Abdominal Exam: Soft.  absent: Tenderness





- Extremities Exam


Extremities Exam: Normal Inspection.  absent: Calf Tenderness





- Neurological Exam


Neurological Exam: Alert, Awake, Oriented x3





- Psychiatric Exam


Psychiatric exam: Normal Affect, Normal Mood





Assessment and Plan





- Assessment and Plan (Free Text)


Plan: 








42 y.o. female with ruptured re-shape gastric balloon now with Hypokalemia











Hypokalemia- 2.9 likely due to poor po intake and frequent episodes of vomiting 

since placement of Reshape balloon 


1- I.V. KCL 10meq x 2


2- KCL 20meq PO daily


3- Repeat BMP in the a.m





HTN- Uncontrolled


1- Will start patient on Lisinopril 5mg


2- Pt. to follow up with Dr. Soria at [facility] on Friday at 1:00 for monitoring 








Leucocytosis- of unclear etiology most likely due to inflammatory reaction due 

to ruptured Reshape balloon 


1- Trending down 15 > 13


2- Afebrile


3- Pt. asymptomatic


4- Monitor for signs and symptoms of infection





Ruptured Re-shape Gastric balloon associated with abdominal pain and poor po 

intake


1- Pt. tolerating now tolerating PO diet  


2- Zofran PRN 


3- UA NL with UCx Pending


4- Protonix 40mg PO


5- General surgery recommendations








DVT prophylaxis


1- SCD





Code status


1- Full code





[facility] called at [phone], spoke with Dr. Cheo Pryor who was 

covering for Dr. Richi Berry. Dr. Pyror states patient had an appointment for 

removal of Balloon on Wednesday but Pt. missed appointment. Pt. progress 

discussed with Dr. Pryor and states that pt. can be discharged once tolerating 

PO diet and follow up with [facility] on Monday. Dr. Pryor states that 

appointment will be setup for Pt. on Monday to be contacted by staff from [facility], Pt. also encouraged to call office on Monday.

## 2017-11-26 VITALS — TEMPERATURE: 97.8 F

## 2017-11-26 VITALS
DIASTOLIC BLOOD PRESSURE: 83 MMHG | SYSTOLIC BLOOD PRESSURE: 128 MMHG | OXYGEN SATURATION: 98 % | HEART RATE: 91 BPM | RESPIRATION RATE: 20 BRPM

## 2017-11-26 LAB
BASOPHILS # BLD AUTO: 0.1 K/UL (ref 0–0.2)
BASOPHILS NFR BLD: 0.9 % (ref 0–2)
BUN SERPL-MCNC: 7 MG/DL (ref 7–17)
CALCIUM SERPL-MCNC: 8 MG/DL (ref 8.4–10.2)
CHLORIDE SERPL-SCNC: 102 MMOL/L (ref 98–107)
CO2 SERPL-SCNC: 29 MMOL/L (ref 22–30)
EOSINOPHIL # BLD AUTO: 0.5 K/UL (ref 0–0.7)
EOSINOPHIL NFR BLD: 3.8 % (ref 0–4)
ERYTHROCYTE [DISTWIDTH] IN BLOOD BY AUTOMATED COUNT: 13.2 % (ref 11.5–14.5)
GLUCOSE SERPL-MCNC: 104 MG/DL (ref 65–105)
HCT VFR BLD CALC: 30.5 % (ref 34–47)
LYMPHOCYTES # BLD AUTO: 2.7 K/UL (ref 1–4.3)
LYMPHOCYTES NFR BLD AUTO: 20.1 % (ref 20–40)
MCH RBC QN AUTO: 29.5 PG (ref 27–31)
MCHC RBC AUTO-ENTMCNC: 34.7 G/DL (ref 33–37)
MCV RBC AUTO: 85.1 FL (ref 81–99)
MONOCYTES # BLD: 0.8 K/UL (ref 0–0.8)
MONOCYTES NFR BLD: 5.9 % (ref 0–10)
NEUTROPHILS # BLD: 9.3 K/UL (ref 1.8–7)
NEUTROPHILS NFR BLD AUTO: 69.3 % (ref 50–75)
NRBC BLD AUTO-RTO: 0.2 % (ref 0–0)
PLATELET # BLD: 422 K/UL (ref 130–400)
PMV BLD AUTO: 9 FL (ref 7.2–11.7)
POTASSIUM SERPL-SCNC: 3.3 MMOL/L (ref 3.6–5)
SODIUM SERPL-SCNC: 138 MMOL/L (ref 132–148)
WBC # BLD AUTO: 13.4 K/UL (ref 4.8–10.8)

## 2017-11-26 NOTE — CP.PCM.DIS
Provider





- Provider


Date of Admission: 


11/25/17 13:11





Attending physician: 


Alexandra Ruth MD





Time Spent in preparation of Discharge (in minutes): 30





Diagnosis





- Discharge Diagnosis


(1) Abdominal pain


Status: Acute   





(2) Hypokalemia


Status: Acute   





(3) Hypertension


Status: Chronic   





Hospital Course





- Lab Results


Lab Results: 


 Micro Results





11/24/17 08:00   Urine,Clean Catch   Urine Culture - Preliminary


                            Gram Positive Cocci





 Most Recent Lab Values











WBC  13.4 K/uL (4.8-10.8)  H  11/26/17  05:45    


 


RBC  3.58 Mil/uL (3.80-5.20)  L  11/26/17  05:45    


 


Hgb  10.6 g/dL (12.0-16.0)  L  11/26/17  05:45    


 


Hct  30.5 % (34.0-47.0)  L  11/26/17  05:45    


 


MCV  85.1 fl (81.0-99.0)   11/26/17  05:45    


 


MCH  29.5 pg (27.0-31.0)   11/26/17  05:45    


 


MCHC  34.7 g/dL (33.0-37.0)   11/26/17  05:45    


 


RDW  13.2 % (11.5-14.5)   11/26/17  05:45    


 


Plt Count  422 K/uL (130-400)  H  11/26/17  05:45    


 


MPV  9.0 fl (7.2-11.7)   11/26/17  05:45    


 


Neut % (Auto)  69.3 % (50.0-75.0)   11/26/17  05:45    


 


Lymph % (Auto)  20.1 % (20.0-40.0)   11/26/17  05:45    


 


Mono % (Auto)  5.9 % (0.0-10.0)   11/26/17  05:45    


 


Eos % (Auto)  3.8 % (0.0-4.0)   11/26/17  05:45    


 


Baso % (Auto)  0.9 % (0.0-2.0)   11/26/17  05:45    


 


Neut #  9.3 K/uL (1.8-7.0)  H  11/26/17  05:45    


 


Lymph #  2.7 K/uL (1.0-4.3)   11/26/17  05:45    


 


Mono #  0.8 K/uL (0.0-0.8)   11/26/17  05:45    


 


Eos #  0.5 K/uL (0.0-0.7)   11/26/17  05:45    


 


Baso #  0.1 K/uL (0.0-0.2)   11/26/17  05:45    


 


Sodium  138 mmol/l (132-148)   11/26/17  05:45    


 


Potassium  3.3 MMOL/L (3.6-5.0)  L  11/26/17  05:45    


 


Chloride  102 mmol/L ()   11/26/17  05:45    


 


Carbon Dioxide  29 mmol/L (22-30)   11/26/17  05:45    


 


Anion Gap  10  (10-20)   11/26/17  05:45    


 


BUN  7 mg/dl (7-17)   11/26/17  05:45    


 


Creatinine  0.6 mg/dl (0.7-1.2)  L  11/26/17  05:45    


 


Est GFR ( Amer)  > 60   11/26/17  05:45    


 


Est GFR (Non-Af Amer)  > 60   11/26/17  05:45    


 


Random Glucose  104 mg/dL ()   11/26/17  05:45    


 


Calcium  8.0 mg/dL (8.4-10.2)  L  11/26/17  05:45    


 


Total Bilirubin  0.3 mg/dl (0.2-1.3)   11/24/17  10:52    


 


AST  27 U/L (14-36)   11/24/17  10:52    


 


ALT  41 U/L (9-52)   11/24/17  10:52    


 


Alkaline Phosphatase  106 U/L ()   11/24/17  10:52    


 


Total Protein  7.1 G/DL (6.3-8.2)   11/24/17  10:52    


 


Albumin  3.7 g/dL (3.5-5.0)   11/24/17  10:52    


 


Globulin  3.4 gm/dL (2.2-3.9)   11/24/17  10:52    


 


Albumin/Globulin Ratio  1.1  (1.0-2.1)   11/24/17  10:52    


 


Lipase  180 U/L ()   11/24/17  10:52    


 


Urine Color  Yellow  (YELLOW)   11/24/17  18:56    


 


Urine Clarity  Clear  (Clear)   11/24/17  18:56    


 


Urine pH  7.0  (5.0-8.0)   11/24/17  18:56    


 


Ur Specific Gravity  1.010  (1.003-1.030)   11/24/17  18:56    


 


Urine Protein  Negative mg/dL (NEGATIVE)   11/24/17  18:56    


 


Urine Glucose (UA)  Neg mg/dL (Normal)   11/24/17  18:56    


 


Urine Ketones  Negative mg/dL (NEGATIVE)   11/24/17  18:56    


 


Urine Blood  Negative  (NEGATIVE)   11/24/17  18:56    


 


Urine Nitrate  Negative  (NEGATIVE)   11/24/17  18:56    


 


Urine Bilirubin  Negative  (NEGATIVE)   11/24/17  18:56    


 


Urine Urobilinogen  0.2-1.0 mg/dL (0.2-1.0)   11/24/17  18:56    


 


Ur Leukocyte Esterase  Neg Sina/uL (Negative)   11/24/17  18:56    


 


Urine RBC (Auto)  2 /hpf (0-3)   11/24/17  18:56    


 


Urine Microscopic WBC  3 /hpf (0-5)   11/24/17  18:56    


 


Ur Squamous Epith Cells  1 /hpf (0-5)   11/24/17  18:56    


 


Amorphous Sediment  Rare /ul (<OCC)  H  11/24/17  10:52    


 


Urine Bacteria  Rare  (<OCC)   11/24/17  18:56    














- Hospital Course


Hospital Course: 











Pt. admitted for poor po intake, nausea, vomiting,  and abdominal pain 

secondary to rupture reshape gastric balloon endoscopic placed at [facility] by Dr. Richi Rangel. During the course of the hospital stay pt. had a CT 

scan abdomen which confirmed ruptured balloon.  CT. scan did not show any 

obstruction or any other acute abdomen. Pt. subsequently hydrated with I.V. 

fluids, Zofran, and pain management. After discussion with Dr. Pryor who was 

covering for Dr. Richi rangel it was noted that rupture can lead to absorption 

of green dye and filtered into urine. Ucx showed UTI. Pt. treated with dose of 

Ceftriaxone I.V. and discharged with Bactrim DS tab po BID. Pt. was also noted 

to have HTN during his course of stay and started on low dose Lisinopril five 

mg which she tolerated without any signs of angioedema. Pt. also noted to be 

hypokalemia and started on I.V. KCL x 2 runs ten meq and discharged with KCL 

20meq. Pt. also given an appointment for Dr. Soria at [facility] on Friday 1:00 pm

  to follow up with for HTN. Pt. would also benefit from Echocardiography as an 

outpatient given pt. has a systolic murmur on physical exam without evidence of 

anemia or LVH on EKG but given pt. has HTN and Obesity and murmur it would be 

worthwhile as an outpatient. Pt. also given a BMP to be done as an outpatient 

to see improvement of Hypokalemia, will follow most likely due to vomiting 

since placement of Reshape gastric balloon on 09/28/17. 








Pt. to follow up with Dr. Richi Rangel on Monday 11/27/17 for enoscopic removal 

of Reshape gastric balloon 





Discharge Medications


Bactrim DS 1 tab PO BID for three days


Lisinopril 5mg


Omeprazole 40mg 


Zofran 4mg

















Discharge Exam





- Head Exam


Head Exam: NORMAL INSPECTION





- Eye Exam


Eye Exam: Normal appearance.  absent: Scleral icterus





- ENT Exam


ENT Exam: Mucous Membranes Dry





- Neck Exam


Neck exam: Full Rom





- Respiratory Exam


Respiratory Exam: Clear to PA & Lateral, NORMAL BREATHING PATTERN





- Cardiovascular Exam


Cardiovascular Exam: REGULAR RHYTHM, +S1, +S2





- GI/Abdominal Exam


GI & Abdominal Exam: Soft.  absent: Tenderness





- Neurological Exam


Neurological exam: Alert, Oriented x3





- Psychiatric Exam


Psychiatric exam: Normal Affect, Normal Mood





Discharge Plan





- Discharge Medications


Prescriptions: 


Lisinopril 5 mg PO DAILY #30 tablet


Lisinopril [Zestril] 5 mg PO DAILY 30 Days #30 tab


Ondansetron [Zofran Inj] 4 mg PO Q6 PRN #7 vial


 PRN Reason: Nausea/Vomiting


Ondansetron ODT [Zofran ODT] 4 mg PO Q6 PRN 7 Days #10 odt


 PRN Reason: Nausea/Vomiting


Ondansetron ODT [Zofran ODT] 4 mg PO Q6 PRN #7 odt


 PRN Reason: Nausea/Vomiting


Pantoprazole [Protonix Inj] 40 mg PO DAILY PRN #7 vial


 PRN Reason: Heartburn


Pantoprazole Sodium [Protonix] 40 mg PO DAILY PRN #7 ect


 PRN Reason: Heartburn


Pantoprazole Sodium [Protonix] 40 mg PO DAILY #7 ect


Potassium Chloride 20 meq PO DAILY #7 tab.er.prt


Potassium Chloride [K-Dur 20] 20 meq PO DAILY #7 tab


Sulfamethoxazole/Trimethoprim [Bactrim  mg-160 mg] 1 tab PO DAILY #3 tab


Sulfamethoxazole/Trimethoprim [Bactrim  mg-160 mg] 1 tab PO BID #6 tab





- Follow Up Plan


Condition: FAIR


Disposition: HOME/ ROUTINE


Additional Instructions: 





Pt. has an appointment with Dr. Perales at [facility] on [address] [place] [address]. 


Pt. to have repeat Basic Metabolic Panel done on Monday 11/27/17 - script 

given. 


Pt. to follow up with Dr. Berry and [facility] for removal of Reshape 

Gastric Balloon. 





Thank You. 








Referrals: 


[facility] [Outside]


Richi Berry DO [[facility]] - 


Tj Soria MD [Resident] -

## 2018-04-27 ENCOUNTER — HOSPITAL ENCOUNTER (EMERGENCY)
Dept: HOSPITAL 14 - H.ER | Age: 43
Discharge: HOME | End: 2018-04-27
Payer: COMMERCIAL

## 2018-04-27 VITALS — TEMPERATURE: 98 F | DIASTOLIC BLOOD PRESSURE: 80 MMHG | SYSTOLIC BLOOD PRESSURE: 147 MMHG

## 2018-04-27 VITALS — HEART RATE: 88 BPM | OXYGEN SATURATION: 98 % | RESPIRATION RATE: 20 BRPM
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
## 2018-04-27 DIAGNOSIS — M54.2: Primary | ICD-10-CM
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
## 2018-04-27 NOTE — RAD
PROCEDURE:  Cervical Spine Radiographs.



HISTORY:

Pain. 



COMPARISON:

None. 



FINDINGS:



BONES:

There is a subtle reversal of cervical curvature without fracture or 

spondylolisthesis identified. No fracture or destructive bony lesion 

is identified throughout the cervical spine including posterior 

elements.  Facet joints appear intact throughout. The odontoid 

process is intact. . 



DISC SPACES:

Normal. 



SOFT TISSUES:

Normal. No prevertebral soft tissue swelling. 



OTHER FINDINGS:

None.



IMPRESSION:

No fracture or spondylolisthesis.  Subtle reversal of the cervical 

curvature.
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
## 2018-05-21 ENCOUNTER — HOSPITAL ENCOUNTER (EMERGENCY)
Dept: HOSPITAL 14 - H.ER | Age: 43
Discharge: HOME | End: 2018-05-21
Payer: MEDICAID

## 2018-05-21 VITALS
DIASTOLIC BLOOD PRESSURE: 81 MMHG | HEART RATE: 89 BPM | RESPIRATION RATE: 16 BRPM | OXYGEN SATURATION: 99 % | TEMPERATURE: 98.9 F | SYSTOLIC BLOOD PRESSURE: 139 MMHG

## 2018-05-21 DIAGNOSIS — L55.0: Primary | ICD-10-CM
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
## 2018-08-25 ENCOUNTER — HOSPITAL ENCOUNTER (EMERGENCY)
Dept: HOSPITAL 14 - H.ER | Age: 43
Discharge: HOME | End: 2018-08-25
Payer: MEDICAID

## 2018-08-25 VITALS
RESPIRATION RATE: 16 BRPM | OXYGEN SATURATION: 100 % | SYSTOLIC BLOOD PRESSURE: 126 MMHG | TEMPERATURE: 98.8 F | DIASTOLIC BLOOD PRESSURE: 85 MMHG | HEART RATE: 78 BPM

## 2018-08-25 VITALS — BODY MASS INDEX: 37.5 KG/M2

## 2018-08-25 DIAGNOSIS — I10: Primary | ICD-10-CM

## 2018-08-25 NOTE — ED PDOC
Lower Extremity Pain/Injury


Time Seen by Provider: 18 22:33


Chief Complaint (Nursing): Lower Extremity Problem/Injury


Chief Complaint (Provider): right foot pain


History Per: Patient


Additional Complaint(s): 


43 y/o female presents with pain to right foot that started yesterday.  Patient 

denies any trauma or injury.  She took naprosyn last night but this did not 

help the pain.  Patient denies any fever or chills.  No numbness or tingling to 

affected area. 





PMD:  [facility]





Past Medical History


Reviewed: Historical Data, Nursing Documentation, Vital Signs


Vital Signs: 





 Last Vital Signs











Temp  98.8 F   18 22:29


 


Pulse  78   18 22:29


 


Resp  16   18 22:29


 


BP  126/85   18 22:29


 


Pulse Ox  100   18 22:29














- Medical History


PMH: Gastritis, HTN





- Surgical History


Surgical History:  ( x 2)


Other surgeries: left leg surgery at age 6





- Family History


Family History: States: Unknown Family Hx, Hypertension (mother )





- Living Arrangements


Living Arrangements: With Family





- Social History


Current smoker - smoking cessation education provided: No


Alcohol: None


Drugs: Denies





- Home Medications


Home Medications: 


 Ambulatory Orders











 Medication  Instructions  Recorded


 


Ondansetron ODT [Zofran ODT] 4 mg PO Q6 PRN 7 Days #10 odt 17


 


Pantoprazole Sodium [Protonix] 40 mg PO DAILY #7 ect 17


 


Potassium Chloride [K-Dur 20] 20 meq PO DAILY #7 tab 17


 


Lisinopril 5 mg PO DAILY #30 tablet 17


 


Lisinopril [Zestril] 5 mg PO DAILY 30 Days #30 tab 17


 


Ondansetron ODT [Zofran ODT] 4 mg PO Q6 PRN #7 odt 17


 


Ondansetron [Zofran Inj] 4 mg PO Q6 PRN #7 vial 17


 


Pantoprazole Sodium [Protonix] 40 mg PO DAILY PRN #7 ect 17


 


Pantoprazole [Protonix Inj] 40 mg PO DAILY PRN #7 vial 17


 


Potassium Chloride 20 meq PO DAILY #7 tab.er.prt 17


 


Sulfamethoxazole/Trimethoprim 1 tab PO BID #6 tab 17





[Bactrim  mg-160 mg]  


 


Sulfamethoxazole/Trimethoprim 1 tab PO DAILY #3 tab 17





[Bactrim  mg-160 mg]  


 


Cyclobenzaprine [Cyclobenzaprine 10 mg PO Q8H PRN #12 tab 18





HCl]  


 


Ibuprofen [Motrin Tab] 800 mg PO Q6H PRN #20 tab 18


 


Aloe Vera 30 ml TOP DAILY PRN #30 oil 18


 


Calamine/Zinc Oxide [Calamine 30 ml EXT DAILY #1 bottle 18





Lotion]  


 


Ibuprofen [Motrin] 600 mg PO Q8 PRN #21 tab 18


 


Ibuprofen [Motrin Tab] 800 mg PO Q8 PRN #20 tab 18














- Allergies


Allergies/Adverse Reactions: 


 Allergies











Allergy/AdvReac Type Severity Reaction Status Date / Time


 


No Known Allergies Allergy   Verified 18 22:29














Review of Systems


ROS Statement: Except As Marked, All Systems Reviewed And Found Negative


Constitutional: Negative for: Fever


Musculoskeletal: Positive for: Foot Pain (right)





Physical Exam





- Reviewed


Nursing Documentation Reviewed: Yes


Vital Signs Reviewed: Yes





- Physical Exam


Appears: Positive for: Well


Skin: Positive for: Normal Color.  Negative for: Rash


Eye Exam: Positive for: Normal appearance


Extremity: Positive for: Other (minimal erythema and mild tenderness to medial 

aspect of right foot, no cellulitis noted, N/V intact)


Neurologic/Psych: Positive for: Alert, Oriented





- ECG


O2 Sat by Pulse Oximetry: 100


Pulse Ox Interpretation: Normal





- Other Rad


  ** Right fot x-ray


X-Ray: Interpreted by Me, Viewed By Me


X-Ray Interpretation: no fx, no dis





Medical Decision Making


Medical Decision Makin y/o female with right foot pain





Plan:


X-ray right foot


Pain meds offered but patient declined





Patient is aware of x-ray results. All questions answered. Crutches were 

declined. See procedure note. Prescription for Motrin provided. Patient 

referred to podiatry clinic for follow-up and was advised to return to ED any 

time if acutely worse.





Procedures





- Splinting


Location: right foot


Pre-Made Type: ace wrap and ortho shoe


Pre-Proc Neuro Vasc Exam: normal


Post-Proc Neuro Vasc Exam: normal





Disposition





- Clinical Impression


Clinical Impression: 


 Foot sprain








- Patient ED Disposition


Is Patient to be Admitted: No


Counseled Patient/Family Regarding: Studies Performed, Diagnosis, Need For 

Followup, Rx Given





- Disposition


Referrals: 


Podiatry Clinic [Outside]


Disposition: Routine/Home


Disposition Time: 22:50


Condition: STABLE


Additional Instructions: 


Ice, rest and elevate affected area. Take prescription meds as directed as 

needed for pain. Follow-up with podiatry clinic or return to emergency room any 

time if acutely worse.


Prescriptions: 


Ibuprofen [Motrin Tab] 800 mg PO Q8 PRN #20 tab


 PRN Reason: Pain, Moderate (4-7)


Instructions:  Foot Sprain (DC)


Forms:  [facility] (English)

## 2018-08-26 NOTE — RAD
Date of service: 



08/25/2018



PROCEDURE:  Right Foot Radiographs.



HISTORY:

pain  



COMPARISON:

Correlation made with right calcaneal radiographs dated 04/02/2016



FINDINGS:



BONES:

Normal. No fracture. 



JOINTS:

Normal. 



SOFT TISSUES:

Normal. 



OTHER FINDINGS:

None.



IMPRESSION:

Normal right foot radiographs.

## 2018-10-14 ENCOUNTER — HOSPITAL ENCOUNTER (EMERGENCY)
Dept: HOSPITAL 14 - H.ER | Age: 43
Discharge: HOME | End: 2018-10-14
Payer: MEDICAID

## 2018-10-14 VITALS
TEMPERATURE: 98.2 F | SYSTOLIC BLOOD PRESSURE: 148 MMHG | RESPIRATION RATE: 20 BRPM | DIASTOLIC BLOOD PRESSURE: 88 MMHG | HEART RATE: 96 BPM | OXYGEN SATURATION: 99 %

## 2018-10-14 VITALS — BODY MASS INDEX: 37.5 KG/M2

## 2018-10-14 DIAGNOSIS — M79.671: Primary | ICD-10-CM

## 2018-10-14 DIAGNOSIS — I10: ICD-10-CM

## 2018-10-14 NOTE — ED PDOC
Lower Extremity Pain/Injury


Chief Complaint (Provider): right foot pain


History Per: Patient


Additional Complaint(s): 


42 y/o female presents with right foot pain for the past 6 weeks.  She denies 

any trauma or injury, denies any fever or chills.  Patient was seen for same 

complaint on 18 at this ED and has x-rays done at that time that resulted 

negative.  Patient did not take any meds for pain relief today prior to arrival.







PMD:  [facility] 





<Asuncion Chaparro - Last Filed: 10/14/18 16:56>





<Shanti Albarado - Last Filed: 10/15/18 00:00>


Time Seen by Provider: 10/14/18 16:34


Chief Complaint (Nursing): Lower Extremity Problem/Injury





Supervising Attending Note





- Attestation:


I have personally seen and examined this patient.: No


I have reviewed all pertinent clinical information, including history, physical 

exam and plan: Yes





<Shanti Albarado - Last Filed: 10/15/18 00:00>





Past Medical History


Reviewed: Historical Data, Nursing Documentation, Vital Signs


Vital Signs: 





                                Last Vital Signs











Temp  98.2 F   10/14/18 16:30


 


Pulse  96 H  10/14/18 16:30


 


Resp  20   10/14/18 16:30


 


BP  148/88   10/14/18 16:30


 


Pulse Ox  99   10/14/18 16:30














- Medical History


PMH: Gastritis, HTN





- Surgical History


Surgical History:  ( x 2)





- Family History


Family History: States: Hypertension (mother )





- Living Arrangements


Living Arrangements: With Family





- Social History


Current smoker - smoking cessation education provided: No


Alcohol: None


Drugs: Denies





<Asuncion Chaparro - Last Filed: 10/14/18 16:56>


Vital Signs: 





                                Last Vital Signs











Temp  98.2 F   10/14/18 16:30


 


Pulse  96 H  10/14/18 16:30


 


Resp  20   10/14/18 16:30


 


BP  148/88   10/14/18 16:30


 


Pulse Ox  99   10/14/18 17:06














<Shanti Albarado - Last Filed: 10/15/18 00:00>





- Home Medications


Home Medications: 


                                Ambulatory Orders











 Medication  Instructions  Recorded


 


Ondansetron ODT [Zofran ODT] 4 mg PO Q6 PRN 7 Days #10 odt 17


 


Pantoprazole Sodium [Protonix] 40 mg PO DAILY #7 ect 17


 


Potassium Chloride [K-Dur 20] 20 meq PO DAILY #7 tab 17


 


Ondansetron ODT [Zofran ODT] 4 mg PO Q6 PRN #7 odt 17


 


Pantoprazole Sodium [Protonix] 40 mg PO DAILY PRN #7 ect 17


 


RX: Lisinopril 5 mg PO DAILY #30 tablet 17


 


RX: Lisinopril [Zestril] 5 mg PO DAILY 30 Days #30 tab 17


 


RX: Ondansetron [Zofran Inj] 4 mg PO Q6 PRN #7 vial 17


 


RX: Pantoprazole [Protonix Inj] 40 mg PO DAILY PRN #7 vial 17


 


RX: Potassium Chloride 20 meq PO DAILY #7 tab.er.prt 17


 


Sulfamethoxazole/Trimethoprim 1 tab PO BID #6 tab 17





[Bactrim  mg-160 mg]  


 


Sulfamethoxazole/Trimethoprim 1 tab PO DAILY #3 tab 17





[Bactrim  mg-160 mg]  


 


Cyclobenzaprine [Cyclobenzaprine 10 mg PO Q8H PRN #12 tab 18





HCl]  


 


Ibuprofen [Motrin Tab] 800 mg PO Q6H PRN #20 tab 18


 


Calamine/Zinc Oxide [Calamine 30 ml EXT DAILY #1 bottle 18





Lotion]  


 


Ibuprofen [Motrin] 600 mg PO Q8 PRN #21 tab 18


 


RX: Aloe Vera 30 ml TOP DAILY PRN #30 oil 18


 


Ibuprofen [Motrin Tab] 800 mg PO Q8 PRN #20 tab 18


 


Ibuprofen [Motrin Tab] 800 mg PO Q8 PRN #20 tab 10/14/18














- Allergies


Allergies/Adverse Reactions: 


                                    Allergies











Allergy/AdvReac Type Severity Reaction Status Date / Time


 


No Known Allergies Allergy   Verified 18 22:29














Wells Criteria for PE





- Wells Criteria for Pulmonary Embolism


Clinical Signs and Symptoms of DVT: No


P.E is #1 Diagnosis, or Equally Likely: No


Heart Rate >100: No


Immobilization at least 3 days;Surgery previous 4 weeks: No


Previous, objectively diagnosed PE or DVT: No


Hemoptysis: No


Malignancy w/treatment within 6 months, or palliative: No


Total Score: 0





<Kayla Chaparroissa - Last Filed: 10/14/18 16:56>





Review of Systems


ROS Statement: Except As Marked, All Systems Reviewed And Found Negative


Musculoskeletal: Positive for: Foot Pain (right foot pain for 6 weeks)





<Asuncion Chaparro - Last Filed: 10/14/18 16:56>





Physical Exam





- Reviewed


Nursing Documentation Reviewed: Yes


Vital Signs Reviewed: Yes





- Physical Exam


Appears: Positive for: Well, Non-toxic, No Acute Distress


Skin: Positive for: Normal Color.  Negative for: Rash


Eye Exam: Positive for: Normal appearance


Neck: Positive for: Normal


Extremity: Positive for: Other (mild swelling and tenderness to arch of right 

foot with no ecchymosis, no open wounds or signs of infection)


Neurologic/Psych: Positive for: Alert, Oriented





<Asuncion Chaparro - Last Filed: 10/14/18 16:56>





- ECG


O2 Sat by Pulse Oximetry: 99


Pulse Ox Interpretation: Normal





<Asuncion Chaparro - Last Filed: 10/14/18 16:56>





Medical Decision Making


Medical Decision Makin y/o with right foot pain x 6 weeks





Patient had x-ray completed during last visit which were read as negative. 





Plan:


PO motrin for pain


Case was d/w podiatry resident, Dr. Amaral who recommends follow up with 

clinic this week for further evaluation of ongoing symptoms.  





Patient was instructed to call podiatry clinic in the morning to arrange for 

follow up visit this coming Wednesday.  





<Asuncion Chaparro - Last Filed: 10/14/18 16:56>





Disposition





- Patient ED Disposition


Is Patient to be Admitted: No


Counseled Patient/Family Regarding: Diagnosis, Need For Followup, Rx Given





- Disposition


Disposition: Routine/Home


Disposition Time: 17:02





<Asuncion Chaparro - Last Filed: 10/14/18 16:56>





<Shanti Albarado - Last Filed: 10/15/18 00:00>





- Clinical Impression


Clinical Impression: 


 Right foot pain








- Disposition


Referrals: 


Podiatry Clinic [Outside]


Condition: STABLE


Additional Instructions: 


CALL PODIATRY CLINIC FIRST THING IN THE MORNING TO ARRANGE FOR FOLLOW UP VISIT 

FOR THIS COMING WEDNESDAY.  WHEN YOU CALL, PLEASE MENTION THAT YOU WERE SEEN IN 

ED TODAY AND TOLD TO FOLLOW UP ASAP AT CLINIC.





TAKE RX MEDS AS DIRECTED AS NEEDED FOR PAIN.





ICE, REST AND ELEVATE AFFECTED AREA. 


Prescriptions: 


Ibuprofen [Motrin Tab] 800 mg PO Q8 PRN #20 tab


 PRN Reason: Pain, Moderate (4-7)


Instructions:  Muscle and Bone Pain (DC)


Forms:  [facility] (English)

## 2018-11-19 ENCOUNTER — HOSPITAL ENCOUNTER (EMERGENCY)
Dept: HOSPITAL 14 - H.ER | Age: 43
Discharge: HOME | End: 2018-11-19
Payer: MEDICAID

## 2018-11-19 VITALS — BODY MASS INDEX: 37.5 KG/M2

## 2018-11-19 DIAGNOSIS — J01.90: ICD-10-CM

## 2018-11-19 DIAGNOSIS — I10: ICD-10-CM

## 2018-11-19 DIAGNOSIS — J40: Primary | ICD-10-CM

## 2018-11-19 LAB
ALBUMIN SERPL-MCNC: 4.5 G/DL (ref 3.5–5)
ALBUMIN/GLOB SERPL: 1.1 {RATIO} (ref 1–2.1)
ALT SERPL-CCNC: 36 U/L (ref 9–52)
AST SERPL-CCNC: 37 U/L (ref 14–36)
BACTERIA #/AREA URNS HPF: (no result) /[HPF]
BASOPHILS # BLD AUTO: 0 K/UL (ref 0–0.2)
BASOPHILS NFR BLD: 0.2 % (ref 0–2)
BILIRUB UR-MCNC: NEGATIVE MG/DL
BUN SERPL-MCNC: 12 MG/DL (ref 7–17)
CALCIUM SERPL-MCNC: 9.4 MG/DL (ref 8.4–10.2)
COLOR UR: YELLOW
EOSINOPHIL # BLD AUTO: 0.7 K/UL (ref 0–0.7)
EOSINOPHIL NFR BLD: 4.9 % (ref 0–4)
ERYTHROCYTE [DISTWIDTH] IN BLOOD BY AUTOMATED COUNT: 13.3 % (ref 11.5–14.5)
GFR NON-AFRICAN AMERICAN: > 60
GLUCOSE UR STRIP-MCNC: (no result) MG/DL
HGB BLD-MCNC: 14 G/DL (ref 12–16)
LEUKOCYTE ESTERASE UR-ACNC: (no result) LEU/UL
LYMPHOCYTES # BLD AUTO: 3.7 K/UL (ref 1–4.3)
LYMPHOCYTES NFR BLD AUTO: 27.4 % (ref 20–40)
MCH RBC QN AUTO: 28.6 PG (ref 27–31)
MCHC RBC AUTO-ENTMCNC: 33.2 G/DL (ref 33–37)
MCV RBC AUTO: 86.1 FL (ref 81–99)
MONOCYTES # BLD: 0.9 K/UL (ref 0–0.8)
MONOCYTES NFR BLD: 6.5 % (ref 0–10)
NEUTROPHILS # BLD: 8.3 K/UL (ref 1.8–7)
NEUTROPHILS NFR BLD AUTO: 61 % (ref 50–75)
NRBC BLD AUTO-RTO: 0.2 % (ref 0–0)
PH UR STRIP: 6 [PH] (ref 5–8)
PLATELET # BLD: 494 K/UL (ref 130–400)
PMV BLD AUTO: 8.3 FL (ref 7.2–11.7)
PROT UR STRIP-MCNC: NEGATIVE MG/DL
RBC # BLD AUTO: 4.9 MIL/UL (ref 3.8–5.2)
RBC # UR STRIP: NEGATIVE /UL
SP GR UR STRIP: 1.02 (ref 1–1.03)
SQUAMOUS EPITHIAL: 1 /HPF (ref 0–5)
URINE CLARITY: CLEAR
UROBILINOGEN UR-MCNC: (no result) MG/DL (ref 0.2–1)
WBC # BLD AUTO: 13.6 K/UL (ref 4.8–10.8)

## 2018-11-19 PROCEDURE — 81025 URINE PREGNANCY TEST: CPT

## 2018-11-19 PROCEDURE — 81003 URINALYSIS AUTO W/O SCOPE: CPT

## 2018-11-19 PROCEDURE — 87804 INFLUENZA ASSAY W/OPTIC: CPT

## 2018-11-19 PROCEDURE — 71045 X-RAY EXAM CHEST 1 VIEW: CPT

## 2018-11-19 PROCEDURE — 87040 BLOOD CULTURE FOR BACTERIA: CPT

## 2018-11-19 PROCEDURE — 85025 COMPLETE CBC W/AUTO DIFF WBC: CPT

## 2018-11-19 PROCEDURE — 96374 THER/PROPH/DIAG INJ IV PUSH: CPT

## 2018-11-19 PROCEDURE — 99283 EMERGENCY DEPT VISIT LOW MDM: CPT

## 2018-11-19 PROCEDURE — 87070 CULTURE OTHR SPECIMN AEROBIC: CPT

## 2018-11-19 PROCEDURE — 94640 AIRWAY INHALATION TREATMENT: CPT

## 2018-11-19 PROCEDURE — 80053 COMPREHEN METABOLIC PANEL: CPT

## 2018-11-19 PROCEDURE — 87430 STREP A AG IA: CPT

## 2018-11-19 PROCEDURE — 83605 ASSAY OF LACTIC ACID: CPT

## 2018-11-20 VITALS
HEART RATE: 94 BPM | RESPIRATION RATE: 17 BRPM | OXYGEN SATURATION: 100 % | DIASTOLIC BLOOD PRESSURE: 93 MMHG | TEMPERATURE: 98.2 F | SYSTOLIC BLOOD PRESSURE: 145 MMHG

## 2018-11-20 NOTE — RAD
Date of service: 



11/19/2018



HISTORY:

 cough 



COMPARISON:

09/15/2017. 



FINDINGS:



LUNGS:

The lungs are well inflated and clear.



PLEURA:

No pleural effusions or pneumothorax. 



CARDIOVASCULAR:

The heart is normal in size.  No aortic atherosclerotic calcification 

present. 



OSSEOUS STRUCTURES:

Within normal limits for the patient's age.



VISUALIZED UPPER ABDOMEN:

Normal.



OTHER FINDINGS:

None.



IMPRESSION:

No active pulmonary disease.

## 2021-02-18 NOTE — CT
PROCEDURE:  CT Abdomen and Pelvis with contrast



HISTORY:

abd pain/VOMITING



COMPARISON:

No prior CT of the abdomen available for comparison.



TECHNIQUE:

Contrast dose: Omnipaque 300, 95 cc



Radiation dose:



Total exam DLP = 1118.59 mGy-cm.



This CT exam was performed using one or more of the following dose 

reduction techniques: Automated exposure control, adjustment of the 

mA and/or kV according to patient size, and/or use of iterative 

reconstruction technique.



FINDINGS:



LOWER THORAX:

Cardiomegaly limited bilateral basilar dependent atelectasis is 

appreciated. 



LIVER:

Prominent diffuse fatty and trace liver is appreciate without 

discrete mass or cystic change evident. No prominent intrahepatic 

biliary dilatation. 



GALLBLADDER AND BILE DUCTS:

The gallbladder is distended but is otherwise unremarkable. 



PANCREAS:

Unremarkable. No gross lesion or ductal dilatation.



SPLEEN:

Unremarkable. 



ADRENALS:

Unremarkable. No mass. 



KIDNEYS AND URETERS:

Unremarkable. No hydronephrosis. No solid mass. 



VASCULATURE:

Unremarkable. No aortic aneurysm. 



BOWEL:

The stomach is distended withweight loss  gastric balloon. The bowel 

appears nonobstructive. No gross mural thickening. Moderate retained 

fecal material seen throughout large-bowel.



APPENDIX:

Normal appendix. 



PERITONEUM:

Unremarkable. No free fluid. No free air. 



LYMPH NODES:

Unremarkable. No enlarged lymph nodes. 



BLADDER:

Unremarkable. 



REPRODUCTIVE:

1 cm right adnexal cysts is appreciated with trace fluid in the 

cul-de-sac.  Inhomogeneous density throughout the low uterus may 

reflect uterine fibroids. Left adnexal part appears unremarkable. 



BONES:

No acute fracture. 



OTHER FINDINGS:

None.



IMPRESSION:

Nonacute abdomen pelvis CT examination with central small probable 

cyst at the right ovary and trace cul-de-sac fluid.



Diffuse fatty infiltration liver noted. Medical Necessity Information: It is in your best interest to select a reason for this procedure from the list below. All of these items fulfill various CMS LCD requirements except lesion extends to a margin. Include Z78.9 (Other Specified Conditions Influencing Health Status) As An Associated Diagnosis?: No Medical Necessity Clause: This procedure was medically necessary because the lesion that was treated was: Lab: [id number] Facility: [address] [place] [address] Body Location Override (Optional - Billing Will Still Be Based On Selected Body Map Location If Applicable): central PV Surgeon (Optional): Julio Soriano MD Biopsy Photograph Reviewed: Yes Size Of Lesion In Cm: 2.1 X Size Of Lesion In Cm (Optional): 2 Size Of Margin In Cm: 0.2 Anesthesia Volume In Cc: 1 Excision Method: Elliptical Repair Type: Complex Suturegard Retention Suture: 2-0 Nylon Retention Suture Bite Size: 3 mm Length To Time In Minutes Device Was In Place: 10 Intermediate / Complex Repair - Final Wound Length In Cm: 4 Width Of Defect Perpendicular To Closure In Cm (Required): 1.3 Distance Of Undermining In Cm (Required): 1.5 Undermining Type: Entire Wound Debridement Text: The wound edges were debrided prior to proceeding with the closure to facilitate wound healing. Helical Rim Text: The closure involved the helical rim. Vermilion Border Text: The closure involved the vermilion border. Nostril Rim Text: The closure involved the nostril rim. Retention Suture Text: Retention sutures were placed to support the closure and prevent dehiscence. Primary Defect Length (In Cm): 0 Suture Removal: 14 days Epidermal Closure Graft Donor Site (Optional): simple interrupted Graft Donor Site Bandage (Optional-Leave Blank If You Don't Want In Note): Steri-strips and a pressure bandage were applied to the donor site. Detail Level: Detailed Excision Depth: adipose tissue Scalpel Size: 15 blade Anesthesia Type: 2% lidocaine without epinephrine Hemostasis: Electrocautery Estimated Blood Loss (Cc): less than 5 cc Anesthesia Type: 2% lidocaine with epinephrine Deep Sutures: 3-0 Polysorb Epidermal Sutures: 4-0 Nylon Epidermal Closure: running Wound Care: Bacitracin Dressing: pressure dressing with telfa Suturegard Intro: Intraoperative tissue expansion was performed, utilizing the SUTUREGARD device, in order to reduce wound tension. Suturegard Body: The suture ends were repeatedly re-tightened and re-clamped to achieve the desired tissue expansion. Hemigard Intro: Due to skin fragility and wound tension, it was decided to use HEMIGARD adhesive retention suture devices to permit a linear closure. The skin was cleaned and dried for a 6cm distance away from the wound. Excessive hair, if present, was removed to allow for adhesion. Hemigard Postcare Instructions: The HEMIGARD strips are to remain completely dry for at least 5-7 days. Complex Repair Preamble Text (Leave Blank If You Do Not Want): Extensive wide undermining was performed. Intermediate Repair Preamble Text (Leave Blank If You Do Not Want): Undermining was performed with blunt dissection. Fusiform Excision Additional Text (Leave Blank If You Do Not Want): The margin was drawn around the clinically apparent lesion. A fusiform shape was then drawn on the skin incorporating the lesion and margins. Incisions were then made along these lines to the appropriate tissue plane and the lesion was extirpated. Eliptical Excision Additional Text (Leave Blank If You Do Not Want): The margin was drawn around the clinically apparent lesion. An elliptical shape was then drawn on the skin incorporating the lesion and margins. Incisions were then made along these lines to the appropriate tissue plane and the lesion was extirpated. Saucerization Excision Additional Text (Leave Blank If You Do Not Want): The margin was drawn around the clinically apparent lesion. Incisions were then made along these lines, in a tangential fashion, to the appropriate tissue plane and the lesion was extirpated. Slit Excision Additional Text (Leave Blank If You Do Not Want): A linear line was drawn on the skin overlying the lesion. An incision was made slowly until the lesion was visualized. Once visualized, the lesion was removed with blunt dissection. Excisional Biopsy Additional Text (Leave Blank If You Do Not Want): The margin was drawn around the clinically apparent lesion. An elliptical shape was then drawn on the skin incorporating the lesion and margins.  Incisions were then made along these lines to the appropriate tissue plane and the lesion was extirpated. Perilesional Excision Additional Text (Leave Blank If You Do Not Want): The margin was drawn around the clinically apparent lesion. Incisions were then made along these lines to the appropriate tissue plane and the lesion was extirpated. Repair Performed By Another Provider Text (Leave Blank If You Do Not Want): After the tissue was excised the defect was repaired by another provider. No Repair - Repaired With Adjacent Surgical Defect Text (Leave Blank If You Do Not Want): After the excision the defect was repaired concurrently with another surgical defect which was in close approximation. Advancement Flap (Single) Text: The defect edges were debeveled with a #15 scalpel blade. Given the location of the defect and the proximity to free margins a single advancement flap was deemed most appropriate. Using a sterile surgical marker, an appropriate advancement flap was drawn incorporating the defect and placing the expected incisions within the relaxed skin tension lines where possible. The area thus outlined was incised deep to adipose tissue with a #15 scalpel blade. The skin margins were undermined to an appropriate distance in all directions utilizing iris scissors. Advancement Flap (Double) Text: The defect edges were debeveled with a #15 scalpel blade. Given the location of the defect and the proximity to free margins a double advancement flap was deemed most appropriate. Using a sterile surgical marker, the appropriate advancement flaps were drawn incorporating the defect and placing the expected incisions within the relaxed skin tension lines where possible. The area thus outlined was incised deep to adipose tissue with a #15 scalpel blade. The skin margins were undermined to an appropriate distance in all directions utilizing iris scissors. Burow's Advancement Flap Text: The defect edges were debeveled with a #15 scalpel blade. Given the location of the defect and the proximity to free margins a Burow's advancement flap was deemed most appropriate. Using a sterile surgical marker, the appropriate advancement flap was drawn incorporating the defect and placing the expected incisions within the relaxed skin tension lines where possible. The area thus outlined was incised deep to adipose tissue with a #15 scalpel blade. The skin margins were undermined to an appropriate distance in all directions utilizing iris scissors. Chonodrocutaneous Helical Advancement Flap Text: The defect edges were debeveled with a #15 scalpel blade. Given the location of the defect and the proximity to free margins a chondrocutaneous helical advancement flap was deemed most appropriate. Using a sterile surgical marker, the appropriate advancement flap was drawn incorporating the defect and placing the expected incisions within the relaxed skin tension lines where possible. The area thus outlined was incised deep to adipose tissue with a #15 scalpel blade. The skin margins were undermined to an appropriate distance in all directions utilizing iris scissors. Crescentic Advancement Flap Text: The defect edges were debeveled with a #15 scalpel blade. Given the location of the defect and the proximity to free margins a crescentic advancement flap was deemed most appropriate. Using a sterile surgical marker, the appropriate advancement flap was drawn incorporating the defect and placing the expected incisions within the relaxed skin tension lines where possible. The area thus outlined was incised deep to adipose tissue with a #15 scalpel blade. The skin margins were undermined to an appropriate distance in all directions utilizing iris scissors. A-T Advancement Flap Text: The defect edges were debeveled with a #15 scalpel blade. Given the location of the defect, shape of the defect and the proximity to free margins an A-T advancement flap was deemed most appropriate. Using a sterile surgical marker, an appropriate advancement flap was drawn incorporating the defect and placing the expected incisions within the relaxed skin tension lines where possible. The area thus outlined was incised deep to adipose tissue with a #15 scalpel blade. The skin margins were undermined to an appropriate distance in all directions utilizing iris scissors. O-T Advancement Flap Text: The defect edges were debeveled with a #15 scalpel blade. Given the location of the defect, shape of the defect and the proximity to free margins an O-T advancement flap was deemed most appropriate. Using a sterile surgical marker, an appropriate advancement flap was drawn incorporating the defect and placing the expected incisions within the relaxed skin tension lines where possible. The area thus outlined was incised deep to adipose tissue with a #15 scalpel blade. The skin margins were undermined to an appropriate distance in all directions utilizing iris scissors. O-L Flap Text: The defect edges were debeveled with a #15 scalpel blade. Given the location of the defect, shape of the defect and the proximity to free margins an O-L flap was deemed most appropriate. Using a sterile surgical marker, an appropriate advancement flap was drawn incorporating the defect and placing the expected incisions within the relaxed skin tension lines where possible. The area thus outlined was incised deep to adipose tissue with a #15 scalpel blade. The skin margins were undermined to an appropriate distance in all directions utilizing iris scissors. O-Z Flap Text: The defect edges were debeveled with a #15 scalpel blade. Given the location of the defect, shape of the defect and the proximity to free margins an O-Z flap was deemed most appropriate. Using a sterile surgical marker, an appropriate transposition flap was drawn incorporating the defect and placing the expected incisions within the relaxed skin tension lines where possible. The area thus outlined was incised deep to adipose tissue with a #15 scalpel blade. The skin margins were undermined to an appropriate distance in all directions utilizing iris scissors. Double O-Z Flap Text: The defect edges were debeveled with a #15 scalpel blade. Given the location of the defect, shape of the defect and the proximity to free margins a Double O-Z flap was deemed most appropriate. Using a sterile surgical marker, an appropriate transposition flap was drawn incorporating the defect and placing the expected incisions within the relaxed skin tension lines where possible. The area thus outlined was incised deep to adipose tissue with a #15 scalpel blade. The skin margins were undermined to an appropriate distance in all directions utilizing iris scissors. V-Y Flap Text: The defect edges were debeveled with a #15 scalpel blade. Given the location of the defect, shape of the defect and the proximity to free margins a V-Y flap was deemed most appropriate. Using a sterile surgical marker, an appropriate advancement flap was drawn incorporating the defect and placing the expected incisions within the relaxed skin tension lines where possible. The area thus outlined was incised deep to adipose tissue with a #15 scalpel blade. The skin margins were undermined to an appropriate distance in all directions utilizing iris scissors. Mercedes Flap Text: The defect edges were debeveled with a #15 scalpel blade. Given the location of the defect, shape of the defect and the proximity to free margins a Mercedes flap was deemed most appropriate. Using a sterile surgical marker, an appropriate advancement flap was drawn incorporating the defect and placing the expected incisions within the relaxed skin tension lines where possible. The area thus outlined was incised deep to adipose tissue with a #15 scalpel blade. The skin margins were undermined to an appropriate distance in all directions utilizing iris scissors. Modified Advancement Flap Text: The defect edges were debeveled with a #15 scalpel blade. Given the location of the defect, shape of the defect and the proximity to free margins a modified advancement flap was deemed most appropriate. Using a sterile surgical marker, an appropriate advancement flap was drawn incorporating the defect and placing the expected incisions within the relaxed skin tension lines where possible. The area thus outlined was incised deep to adipose tissue with a #15 scalpel blade. The skin margins were undermined to an appropriate distance in all directions utilizing iris scissors. Mucosal Advancement Flap Text: Given the location of the defect, shape of the defect and the proximity to free margins a mucosal advancement flap was deemed most appropriate. Incisions were made with a 15 blade scalpel in the appropriate fashion along the cutaneous vermillion border and the mucosal lip. The remaining actinically damaged mucosal tissue was excised. The mucosal advancement flap was then elevated to the gingival sulcus with care taken to preserve the neurovascular structures and advanced into the primary defect. Care was taken to ensure that precise realignment of the vermilion border was achieved. Hatchet Flap Text: The defect edges were debeveled with a #15 scalpel blade. Given the location of the defect, shape of the defect and the proximity to free margins a hatchet flap was deemed most appropriate. Using a sterile surgical marker, an appropriate hatchet flap was drawn incorporating the defect and placing the expected incisions within the relaxed skin tension lines where possible. The area thus outlined was incised deep to adipose tissue with a #15 scalpel blade. The skin margins were undermined to an appropriate distance in all directions utilizing iris scissors. Rotation Flap Text: The defect edges were debeveled with a #15 scalpel blade. Given the location of the defect, shape of the defect and the proximity to free margins a rotation flap was deemed most appropriate. Using a sterile surgical marker, an appropriate rotation flap was drawn incorporating the defect and placing the expected incisions within the relaxed skin tension lines where possible. The area thus outlined was incised deep to adipose tissue with a #15 scalpel blade. The skin margins were undermined to an appropriate distance in all directions utilizing iris scissors. Spiral Flap Text: The defect edges were debeveled with a #15 scalpel blade. Given the location of the defect, shape of the defect and the proximity to free margins a spiral flap was deemed most appropriate. Using a sterile surgical marker, an appropriate rotation flap was drawn incorporating the defect and placing the expected incisions within the relaxed skin tension lines where possible. The area thus outlined was incised deep to adipose tissue with a #15 scalpel blade. The skin margins were undermined to an appropriate distance in all directions utilizing iris scissors. Star Wedge Flap Text: The defect edges were debeveled with a #15 scalpel blade. Given the location of the defect, shape of the defect and the proximity to free margins a star wedge flap was deemed most appropriate. Using a sterile surgical marker, an appropriate rotation flap was drawn incorporating the defect and placing the expected incisions within the relaxed skin tension lines where possible. The area thus outlined was incised deep to adipose tissue with a #15 scalpel blade. The skin margins were undermined to an appropriate distance in all directions utilizing iris scissors. Transposition Flap Text: The defect edges were debeveled with a #15 scalpel blade. Given the location of the defect and the proximity to free margins a transposition flap was deemed most appropriate. Using a sterile surgical marker, an appropriate transposition flap was drawn incorporating the defect. The area thus outlined was incised deep to adipose tissue with a #15 scalpel blade. The skin margins were undermined to an appropriate distance in all directions utilizing iris scissors. Muscle Hinge Flap Text: The defect edges were debeveled with a #15 scalpel blade. Given the size, depth and location of the defect and the proximity to free margins a muscle hinge flap was deemed most appropriate. Using a sterile surgical marker, an appropriate hinge flap was drawn incorporating the defect. The area thus outlined was incised with a #15 scalpel blade. The skin margins were undermined to an appropriate distance in all directions utilizing iris scissors. Nasal Turnover Hinge Flap Text: The defect edges were debeveled with a #15 scalpel blade. Given the size, depth, location of the defect and the defect being full thickness a nasal turnover hinge flap was deemed most appropriate. Using a sterile surgical marker, an appropriate hinge flap was drawn incorporating the defect. The area thus outlined was incised with a #15 scalpel blade. The flap was designed to recreate the nasal mucosal lining and the alar rim. The skin margins were undermined to an appropriate distance in all directions utilizing iris scissors. Nasalis-Muscle-Based Myocutaneous Island Pedicle Flap Text: Using a #15 blade, an incision was made around the donor flap to the level of the nasalis muscle. Wide lateral undermining was then performed in both the subcutaneous plane above the nasalis muscle, and in a submuscular plane just above periosteum. This allowed the formation of a free nasalis muscle axial pedicle (based on the angular artery) which was still attached to the actual cutaneous flap, increasing its mobility and vascular viability. Hemostasis was obtained with pinpoint electrocoagulation. The flap was mobilized into position and the pivotal anchor points positioned and stabilized with buried interrupted sutures. Subcutaneous and dermal tissues were closed in a multilayered fashion with sutures. Tissue redundancies were excised, and the epidermal edges were apposed without significant tension and sutured with sutures. Orbicularis Oris Muscle Flap Text: The defect edges were debeveled with a #15 scalpel blade. Given that the defect affected the competency of the oral sphincter an obicularis oris muscle flap was deemed most appropriate to restore this competency and normal muscle function. Using a sterile surgical marker, an appropriate flap was drawn incorporating the defect. The area thus outlined was incised with a #15 scalpel blade. Melolabial Transposition Flap Text: The defect edges were debeveled with a #15 scalpel blade. Given the location of the defect and the proximity to free margins a melolabial flap was deemed most appropriate. Using a sterile surgical marker, an appropriate melolabial transposition flap was drawn incorporating the defect. The area thus outlined was incised deep to adipose tissue with a #15 scalpel blade. The skin margins were undermined to an appropriate distance in all directions utilizing iris scissors. Rhombic Flap Text: The defect edges were debeveled with a #15 scalpel blade. Given the location of the defect and the proximity to free margins a rhombic flap was deemed most appropriate. Using a sterile surgical marker, an appropriate rhombic flap was drawn incorporating the defect. The area thus outlined was incised deep to adipose tissue with a #15 scalpel blade. The skin margins were undermined to an appropriate distance in all directions utilizing iris scissors. Rhomboid Transposition Flap Text: The defect edges were debeveled with a #15 scalpel blade. Given the location of the defect and the proximity to free margins a rhomboid transposition flap was deemed most appropriate. Using a sterile surgical marker, an appropriate rhomboid flap was drawn incorporating the defect. The area thus outlined was incised deep to adipose tissue with a #15 scalpel blade. The skin margins were undermined to an appropriate distance in all directions utilizing iris scissors. Bi-Rhombic Flap Text: The defect edges were debeveled with a #15 scalpel blade. Given the location of the defect and the proximity to free margins a bi-rhombic flap was deemed most appropriate. Using a sterile surgical marker, an appropriate rhombic flap was drawn incorporating the defect. The area thus outlined was incised deep to adipose tissue with a #15 scalpel blade. The skin margins were undermined to an appropriate distance in all directions utilizing iris scissors. Helical Rim Advancement Flap Text: The defect edges were debeveled with a #15 blade scalpel. Given the location of the defect and the proximity to free margins (helical rim) a double helical rim advancement flap was deemed most appropriate. Using a sterile surgical marker, the appropriate advancement flaps were drawn incorporating the defect and placing the expected incisions between the helical rim and antihelix where possible. The area thus outlined was incised through and through with a #15 scalpel blade. With a skin hook and iris scissors, the flaps were gently and sharply undermined and freed up. Bilateral Helical Rim Advancement Flap Text: The defect edges were debeveled with a #15 blade scalpel. Given the location of the defect and the proximity to free margins (helical rim) a bilateral helical rim advancement flap was deemed most appropriate. Using a sterile surgical marker, the appropriate advancement flaps were drawn incorporating the defect and placing the expected incisions between the helical rim and antihelix where possible. The area thus outlined was incised through and through with a #15 scalpel blade. With a skin hook and iris scissors, the flaps were gently and sharply undermined and freed up. Ear Star Wedge Flap Text: The defect edges were debeveled with a #15 blade scalpel. Given the location of the defect and the proximity to free margins (helical rim) an ear star wedge flap was deemed most appropriate. Using a sterile surgical marker, the appropriate flap was drawn incorporating the defect and placing the expected incisions between the helical rim and antihelix where possible. The area thus outlined was incised through and through with a #15 scalpel blade. Banner Transposition Flap Text: The defect edges were debeveled with a #15 scalpel blade. Given the location of the defect and the proximity to free margins a Banner transposition flap was deemed most appropriate. Using a sterile surgical marker, an appropriate flap drawn around the defect. The area thus outlined was incised deep to adipose tissue with a #15 scalpel blade. The skin margins were undermined to an appropriate distance in all directions utilizing iris scissors. Bilobed Flap Text: The defect edges were debeveled with a #15 scalpel blade. Given the location of the defect and the proximity to free margins a bilobe flap was deemed most appropriate. Using a sterile surgical marker, an appropriate bilobe flap drawn around the defect. The area thus outlined was incised deep to adipose tissue with a #15 scalpel blade. The skin margins were undermined to an appropriate distance in all directions utilizing iris scissors. Bilobed Transposition Flap Text: The defect edges were debeveled with a #15 scalpel blade. Given the location of the defect and the proximity to free margins a bilobed transposition flap was deemed most appropriate. Using a sterile surgical marker, an appropriate bilobe flap drawn around the defect. The area thus outlined was incised deep to adipose tissue with a #15 scalpel blade. The skin margins were undermined to an appropriate distance in all directions utilizing iris scissors. Trilobed Flap Text: The defect edges were debeveled with a #15 scalpel blade. Given the location of the defect and the proximity to free margins a trilobed flap was deemed most appropriate. Using a sterile surgical marker, an appropriate trilobed flap drawn around the defect. The area thus outlined was incised deep to adipose tissue with a #15 scalpel blade. The skin margins were undermined to an appropriate distance in all directions utilizing iris scissors. Dorsal Nasal Flap Text: The defect edges were debeveled with a #15 scalpel blade. Given the location of the defect and the proximity to free margins a dorsal nasal flap was deemed most appropriate. Using a sterile surgical marker, an appropriate dorsal nasal flap was drawn around the defect. The area thus outlined was incised deep to adipose tissue with a #15 scalpel blade. The skin margins were undermined to an appropriate distance in all directions utilizing iris scissors. Island Pedicle Flap Text: The defect edges were debeveled with a #15 scalpel blade. Given the location of the defect, shape of the defect and the proximity to free margins an island pedicle advancement flap was deemed most appropriate. Using a sterile surgical marker, an appropriate advancement flap was drawn incorporating the defect, outlining the appropriate donor tissue and placing the expected incisions within the relaxed skin tension lines where possible. The area thus outlined was incised deep to adipose tissue with a #15 scalpel blade. The skin margins were undermined to an appropriate distance in all directions around the primary defect and laterally outward around the island pedicle utilizing iris scissors. There was minimal undermining beneath the pedicle flap. Island Pedicle Flap With Canthal Suspension Text: The defect edges were debeveled with a #15 scalpel blade. Given the location of the defect, shape of the defect and the proximity to free margins an island pedicle advancement flap was deemed most appropriate. Using a sterile surgical marker, an appropriate advancement flap was drawn incorporating the defect, outlining the appropriate donor tissue and placing the expected incisions within the relaxed skin tension lines where possible. The area thus outlined was incised deep to adipose tissue with a #15 scalpel blade. The skin margins were undermined to an appropriate distance in all directions around the primary defect and laterally outward around the island pedicle utilizing iris scissors. There was minimal undermining beneath the pedicle flap. A suspension suture was placed in the canthal tendon to prevent tension and prevent ectropion. Alar Island Pedicle Flap Text: The defect edges were debeveled with a #15 scalpel blade. Given the location of the defect, shape of the defect and the proximity to the alar rim an island pedicle advancement flap was deemed most appropriate. Using a sterile surgical marker, an appropriate advancement flap was drawn incorporating the defect, outlining the appropriate donor tissue and placing the expected incisions within the nasal ala running parallel to the alar rim. The area thus outlined was incised with a #15 scalpel blade. The skin margins were undermined minimally to an appropriate distance in all directions around the primary defect and laterally outward around the island pedicle utilizing iris scissors. There was minimal undermining beneath the pedicle flap. Double Island Pedicle Flap Text: The defect edges were debeveled with a #15 scalpel blade. Given the location of the defect, shape of the defect and the proximity to free margins a double island pedicle advancement flap was deemed most appropriate. Using a sterile surgical marker, an appropriate advancement flap was drawn incorporating the defect, outlining the appropriate donor tissue and placing the expected incisions within the relaxed skin tension lines where possible. The area thus outlined was incised deep to adipose tissue with a #15 scalpel blade. The skin margins were undermined to an appropriate distance in all directions around the primary defect and laterally outward around the island pedicle utilizing iris scissors. There was minimal undermining beneath the pedicle flap. Island Pedicle Flap-Requiring Vessel Identification Text: The defect edges were debeveled with a #15 scalpel blade. Given the location of the defect, shape of the defect and the proximity to free margins an island pedicle advancement flap was deemed most appropriate. Using a sterile surgical marker, an appropriate advancement flap was drawn, based on the axial vessel mentioned above, incorporating the defect, outlining the appropriate donor tissue and placing the expected incisions within the relaxed skin tension lines where possible. The area thus outlined was incised deep to adipose tissue with a #15 scalpel blade. The skin margins were undermined to an appropriate distance in all directions around the primary defect and laterally outward around the island pedicle utilizing iris scissors. There was minimal undermining beneath the pedicle flap. Keystone Flap Text: The defect edges were debeveled with a #15 scalpel blade. Given the location of the defect, shape of the defect a keystone flap was deemed most appropriate. Using a sterile surgical marker, an appropriate keystone flap was drawn incorporating the defect, outlining the appropriate donor tissue and placing the expected incisions within the relaxed skin tension lines where possible. The area thus outlined was incised deep to adipose tissue with a #15 scalpel blade. The skin margins were undermined to an appropriate distance in all directions around the primary defect and laterally outward around the flap utilizing iris scissors. O-T Plasty Text: The defect edges were debeveled with a #15 scalpel blade. Given the location of the defect, shape of the defect and the proximity to free margins an O-T plasty was deemed most appropriate. Using a sterile surgical marker, an appropriate O-T plasty was drawn incorporating the defect and placing the expected incisions within the relaxed skin tension lines where possible. The area thus outlined was incised deep to adipose tissue with a #15 scalpel blade. The skin margins were undermined to an appropriate distance in all directions utilizing iris scissors. O-Z Plasty Text: The defect edges were debeveled with a #15 scalpel blade. Given the location of the defect, shape of the defect and the proximity to free margins an O-Z plasty (double transposition flap) was deemed most appropriate. Using a sterile surgical marker, the appropriate transposition flaps were drawn incorporating the defect and placing the expected incisions within the relaxed skin tension lines where possible. The area thus outlined was incised deep to adipose tissue with a #15 scalpel blade. The skin margins were undermined to an appropriate distance in all directions utilizing iris scissors. Hemostasis was achieved with electrocautery. The flaps were then transposed into place, one clockwise and the other counterclockwise, and anchored with interrupted buried subcutaneous sutures. Double O-Z Plasty Text: The defect edges were debeveled with a #15 scalpel blade. Given the location of the defect, shape of the defect and the proximity to free margins a Double O-Z plasty (double transposition flap) was deemed most appropriate. Using a sterile surgical marker, the appropriate transposition flaps were drawn incorporating the defect and placing the expected incisions within the relaxed skin tension lines where possible. The area thus outlined was incised deep to adipose tissue with a #15 scalpel blade. The skin margins were undermined to an appropriate distance in all directions utilizing iris scissors. Hemostasis was achieved with electrocautery. The flaps were then transposed into place, one clockwise and the other counterclockwise, and anchored with interrupted buried subcutaneous sutures. V-Y Plasty Text: The defect edges were debeveled with a #15 scalpel blade. Given the location of the defect, shape of the defect and the proximity to free margins an V-Y advancement flap was deemed most appropriate. Using a sterile surgical marker, an appropriate advancement flap was drawn incorporating the defect and placing the expected incisions within the relaxed skin tension lines where possible. The area thus outlined was incised deep to adipose tissue with a #15 scalpel blade. The skin margins were undermined to an appropriate distance in all directions utilizing iris scissors. H Plasty Text: Given the location of the defect, shape of the defect and the proximity to free margins a H-plasty was deemed most appropriate for repair. Using a sterile surgical marker, the appropriate advancement arms of the H-plasty were drawn incorporating the defect and placing the expected incisions within the relaxed skin tension lines where possible. The area thus outlined was incised deep to adipose tissue with a #15 scalpel blade. The skin margins were undermined to an appropriate distance in all directions utilizing iris scissors. The opposing advancement arms were then advanced into place in opposite direction and anchored with interrupted buried subcutaneous sutures. W Plasty Text: The lesion was extirpated to the level of the fat with a #15 scalpel blade. Given the location of the defect, shape of the defect and the proximity to free margins a W-plasty was deemed most appropriate for repair. Using a sterile surgical marker, the appropriate transposition arms of the W-plasty were drawn incorporating the defect and placing the expected incisions within the relaxed skin tension lines where possible. The area thus outlined was incised deep to adipose tissue with a #15 scalpel blade. The skin margins were undermined to an appropriate distance in all directions utilizing iris scissors. The opposing transposition arms were then transposed into place in opposite direction and anchored with interrupted buried subcutaneous sutures. Z Plasty Text: The lesion was extirpated to the level of the fat with a #15 scalpel blade. Given the location of the defect, shape of the defect and the proximity to free margins a Z-plasty was deemed most appropriate for repair. Using a sterile surgical marker, the appropriate transposition arms of the Z-plasty were drawn incorporating the defect and placing the expected incisions within the relaxed skin tension lines where possible. The area thus outlined was incised deep to adipose tissue with a #15 scalpel blade. The skin margins were undermined to an appropriate distance in all directions utilizing iris scissors. The opposing transposition arms were then transposed into place in opposite direction and anchored with interrupted buried subcutaneous sutures. Zygomaticofacial Flap Text: Given the location of the defect, shape of the defect and the proximity to free margins a zygomaticofacial flap was deemed most appropriate for repair. Using a sterile surgical marker, the appropriate flap was drawn incorporating the defect and placing the expected incisions within the relaxed skin tension lines where possible. The area thus outlined was incised deep to adipose tissue with a #15 scalpel blade with preservation of a vascular pedicle. The skin margins were undermined to an appropriate distance in all directions utilizing iris scissors. The flap was then placed into the defect and anchored with interrupted buried subcutaneous sutures. Cheek Interpolation Flap Text: A decision was made to reconstruct the defect utilizing an interpolation axial flap and a staged reconstruction. A telfa template was made of the defect. This telfa template was then used to outline the Cheek Interpolation flap. The donor area for the pedicle flap was then injected with anesthesia. The flap was excised through the skin and subcutaneous tissue down to the layer of the underlying musculature. The interpolation flap was carefully excised within this deep plane to maintain its blood supply. The edges of the donor site were undermined. The donor site was closed in a primary fashion. The pedicle was then rotated into position and sutured. Once the tube was sutured into place, adequate blood supply was confirmed with blanching and refill. The pedicle was then wrapped with xeroform gauze and dressed appropriately with a telfa and gauze bandage to ensure continued blood supply and protect the attached pedicle. Cheek-To-Nose Interpolation Flap Text: A decision was made to reconstruct the defect utilizing an interpolation axial flap and a staged reconstruction. A telfa template was made of the defect. This telfa template was then used to outline the Cheek-To-Nose Interpolation flap. The donor area for the pedicle flap was then injected with anesthesia. The flap was excised through the skin and subcutaneous tissue down to the layer of the underlying musculature. The interpolation flap was carefully excised within this deep plane to maintain its blood supply. The edges of the donor site were undermined. The donor site was closed in a primary fashion. The pedicle was then rotated into position and sutured. Once the tube was sutured into place, adequate blood supply was confirmed with blanching and refill. The pedicle was then wrapped with xeroform gauze and dressed appropriately with a telfa and gauze bandage to ensure continued blood supply and protect the attached pedicle. Interpolation Flap Text: A decision was made to reconstruct the defect utilizing an interpolation axial flap and a staged reconstruction. A telfa template was made of the defect. This telfa template was then used to outline the interpolation flap. The donor area for the pedicle flap was then injected with anesthesia. The flap was excised through the skin and subcutaneous tissue down to the layer of the underlying musculature. The interpolation flap was carefully excised within this deep plane to maintain its blood supply. The edges of the donor site were undermined. The donor site was closed in a primary fashion. The pedicle was then rotated into position and sutured. Once the tube was sutured into place, adequate blood supply was confirmed with blanching and refill. The pedicle was then wrapped with xeroform gauze and dressed appropriately with a telfa and gauze bandage to ensure continued blood supply and protect the attached pedicle. Melolabial Interpolation Flap Text: A decision was made to reconstruct the defect utilizing an interpolation axial flap and a staged reconstruction. A telfa template was made of the defect. This telfa template was then used to outline the melolabial interpolation flap. The donor area for the pedicle flap was then injected with anesthesia. The flap was excised through the skin and subcutaneous tissue down to the layer of the underlying musculature. The pedicle flap was carefully excised within this deep plane to maintain its blood supply. The edges of the donor site were undermined. The donor site was closed in a primary fashion. The pedicle was then rotated into position and sutured. Once the tube was sutured into place, adequate blood supply was confirmed with blanching and refill. The pedicle was then wrapped with xeroform gauze and dressed appropriately with a telfa and gauze bandage to ensure continued blood supply and protect the attached pedicle. Mastoid Interpolation Flap Text: A decision was made to reconstruct the defect utilizing an interpolation axial flap and a staged reconstruction. A telfa template was made of the defect. This telfa template was then used to outline the mastoid interpolation flap. The donor area for the pedicle flap was then injected with anesthesia. The flap was excised through the skin and subcutaneous tissue down to the layer of the underlying musculature. The pedicle flap was carefully excised within this deep plane to maintain its blood supply. The edges of the donor site were undermined. The donor site was closed in a primary fashion. The pedicle was then rotated into position and sutured. Once the tube was sutured into place, adequate blood supply was confirmed with blanching and refill. The pedicle was then wrapped with xeroform gauze and dressed appropriately with a telfa and gauze bandage to ensure continued blood supply and protect the attached pedicle. Posterior Auricular Interpolation Flap Text: A decision was made to reconstruct the defect utilizing an interpolation axial flap and a staged reconstruction. A telfa template was made of the defect. This telfa template was then used to outline the posterior auricular interpolation flap. The donor area for the pedicle flap was then injected with anesthesia. The flap was excised through the skin and subcutaneous tissue down to the layer of the underlying musculature. The pedicle flap was carefully excised within this deep plane to maintain its blood supply. The edges of the donor site were undermined. The donor site was closed in a primary fashion. The pedicle was then rotated into position and sutured. Once the tube was sutured into place, adequate blood supply was confirmed with blanching and refill. The pedicle was then wrapped with xeroform gauze and dressed appropriately with a telfa and gauze bandage to ensure continued blood supply and protect the attached pedicle. Paramedian Forehead Flap Text: A decision was made to reconstruct the defect utilizing an interpolation axial flap and a staged reconstruction. A telfa template was made of the defect. This telfa template was then used to outline the paramedian forehead pedicle flap. The donor area for the pedicle flap was then injected with anesthesia. The flap was excised through the skin and subcutaneous tissue down to the layer of the underlying musculature. The pedicle flap was carefully excised within this deep plane to maintain its blood supply. The edges of the donor site were undermined. The donor site was closed in a primary fashion. The pedicle was then rotated into position and sutured. Once the tube was sutured into place, adequate blood supply was confirmed with blanching and refill. The pedicle was then wrapped with xeroform gauze and dressed appropriately with a telfa and gauze bandage to ensure continued blood supply and protect the attached pedicle. Lip Wedge Excision Repair Text: Given the location of the defect and the proximity to free margins a full thickness wedge repair was deemed most appropriate. Using a sterile surgical marker, the appropriate repair was drawn incorporating the defect and placing the expected incisions perpendicular to the vermilion border. The vermilion border was also meticulously outlined to ensure appropriate reapproximation during the repair. The area thus outlined was incised through and through with a #15 scalpel blade. The muscularis and dermis were reaproximated with deep sutures following hemostasis. Care was taken to realign the vermilion border before proceeding with the superficial closure. Once the vermilion was realigned the superfical and mucosal closure was finished. Ftsg Text: The defect edges were debeveled with a #15 scalpel blade. Given the location of the defect, shape of the defect and the proximity to free margins a full thickness skin graft was deemed most appropriate. Using a sterile surgical marker, the primary defect shape was transferred to the donor site. The area thus outlined was incised deep to adipose tissue with a #15 scalpel blade. The harvested graft was then trimmed of adipose tissue until only dermis and epidermis was left. The skin margins of the secondary defect were undermined to an appropriate distance in all directions utilizing iris scissors. The secondary defect was closed with interrupted buried subcutaneous sutures. The skin edges were then re-apposed with running  sutures. The skin graft was then placed in the primary defect and oriented appropriately. Split-Thickness Skin Graft Text: The defect edges were debeveled with a #15 scalpel blade. Given the location of the defect, shape of the defect and the proximity to free margins a split thickness skin graft was deemed most appropriate. Using a sterile surgical marker, the primary defect shape was transferred to the donor site. The split thickness graft was then harvested. The skin graft was then placed in the primary defect and oriented appropriately. Burow's Graft Text: The defect edges were debeveled with a #15 scalpel blade. Given the location of the defect, shape of the defect, the proximity to free margins and the presence of a standing cone deformity a Burow's skin graft was deemed most appropriate. The standing cone was removed and this tissue was then trimmed to the shape of the primary defect. The adipose tissue was also removed until only dermis and epidermis were left. The skin margins of the secondary defect were undermined to an appropriate distance in all directions utilizing iris scissors. The secondary defect was closed with interrupted buried subcutaneous sutures. The skin edges were then re-apposed with running  sutures. The skin graft was then placed in the primary defect and oriented appropriately. Cartilage Graft Text: The defect edges were debeveled with a #15 scalpel blade. Given the location of the defect, shape of the defect, the fact the defect involved a full thickness cartilage defect a cartilage graft was deemed most appropriate. An appropriate donor site was identified, cleansed, and anesthetized. The cartilage graft was then harvested and transferred to the recipient site, oriented appropriately and then sutured into place. The secondary defect was then repaired using a primary closure. Composite Graft Text: The defect edges were debeveled with a #15 scalpel blade. Given the location of the defect, shape of the defect, the proximity to free margins and the fact the defect was full thickness a composite graft was deemed most appropriate. The defect was outline and then transferred to the donor site. A full thickness graft was then excised from the donor site. The graft was then placed in the primary defect, oriented appropriately and then sutured into place. The secondary defect was then repaired using a primary closure. Epidermal Autograft Text: The defect edges were debeveled with a #15 scalpel blade. Given the location of the defect, shape of the defect and the proximity to free margins an epidermal autograft was deemed most appropriate. Using a sterile surgical marker, the primary defect shape was transferred to the donor site. The epidermal graft was then harvested. The skin graft was then placed in the primary defect and oriented appropriately. Dermal Autograft Text: The defect edges were debeveled with a #15 scalpel blade. Given the location of the defect, shape of the defect and the proximity to free margins a dermal autograft was deemed most appropriate. Using a sterile surgical marker, the primary defect shape was transferred to the donor site. The area thus outlined was incised deep to adipose tissue with a #15 scalpel blade. The harvested graft was then trimmed of adipose and epidermal tissue until only dermis was left. The skin graft was then placed in the primary defect and oriented appropriately. Skin Substitute Text: The defect edges were debeveled with a #15 scalpel blade. Given the location of the defect, shape of the defect and the proximity to free margins a skin substitute graft was deemed most appropriate. The graft material was trimmed to fit the size of the defect. The graft was then placed in the primary defect and oriented appropriately. Tissue Cultured Epidermal Autograft Text: The defect edges were debeveled with a #15 scalpel blade. Given the location of the defect, shape of the defect and the proximity to free margins a tissue cultured epidermal autograft was deemed most appropriate. The graft was then trimmed to fit the size of the defect. The graft was then placed in the primary defect and oriented appropriately. Xenograft Text: The defect edges were debeveled with a #15 scalpel blade. Given the location of the defect, shape of the defect and the proximity to free margins a xenograft was deemed most appropriate. The graft was then trimmed to fit the size of the defect. The graft was then placed in the primary defect and oriented appropriately. Purse String (Intermediate) Text: Given the location of the defect and the characteristics of the surrounding skin a purse string intermediate closure was deemed most appropriate. Undermining was performed circumferentially around the surgical defect. A purse string suture was then placed and tightened. Purse String (Simple) Text: Given the location of the defect and the characteristics of the surrounding skin a purse string simple closure was deemed most appropriate. Undermining was performed circumferentially around the surgical defect. A purse string suture was then placed and tightened. Complex Repair And Single Advancement Flap Text: The defect edges were debeveled with a #15 scalpel blade. The primary defect was closed partially with a complex linear closure. Given the location of the remaining defect, shape of the defect and the proximity to free margins a single advancement flap was deemed most appropriate for complete closure of the defect. Using a sterile surgical marker, an appropriate advancement flap was drawn incorporating the defect and placing the expected incisions within the relaxed skin tension lines where possible. The area thus outlined was incised deep to adipose tissue with a #15 scalpel blade. The skin margins were undermined to an appropriate distance in all directions utilizing iris scissors. Complex Repair And Double Advancement Flap Text: The defect edges were debeveled with a #15 scalpel blade. The primary defect was closed partially with a complex linear closure. Given the location of the remaining defect, shape of the defect and the proximity to free margins a double advancement flap was deemed most appropriate for complete closure of the defect. Using a sterile surgical marker, an appropriate advancement flap was drawn incorporating the defect and placing the expected incisions within the relaxed skin tension lines where possible. The area thus outlined was incised deep to adipose tissue with a #15 scalpel blade. The skin margins were undermined to an appropriate distance in all directions utilizing iris scissors. Complex Repair And Modified Advancement Flap Text: The defect edges were debeveled with a #15 scalpel blade. The primary defect was closed partially with a complex linear closure. Given the location of the remaining defect, shape of the defect and the proximity to free margins a modified advancement flap was deemed most appropriate for complete closure of the defect. Using a sterile surgical marker, an appropriate advancement flap was drawn incorporating the defect and placing the expected incisions within the relaxed skin tension lines where possible. The area thus outlined was incised deep to adipose tissue with a #15 scalpel blade. The skin margins were undermined to an appropriate distance in all directions utilizing iris scissors. Complex Repair And A-T Advancement Flap Text: The defect edges were debeveled with a #15 scalpel blade. The primary defect was closed partially with a complex linear closure. Given the location of the remaining defect, shape of the defect and the proximity to free margins an A-T advancement flap was deemed most appropriate for complete closure of the defect. Using a sterile surgical marker, an appropriate advancement flap was drawn incorporating the defect and placing the expected incisions within the relaxed skin tension lines where possible. The area thus outlined was incised deep to adipose tissue with a #15 scalpel blade. The skin margins were undermined to an appropriate distance in all directions utilizing iris scissors. Complex Repair And O-T Advancement Flap Text: The defect edges were debeveled with a #15 scalpel blade. The primary defect was closed partially with a complex linear closure. Given the location of the remaining defect, shape of the defect and the proximity to free margins an O-T advancement flap was deemed most appropriate for complete closure of the defect. Using a sterile surgical marker, an appropriate advancement flap was drawn incorporating the defect and placing the expected incisions within the relaxed skin tension lines where possible. The area thus outlined was incised deep to adipose tissue with a #15 scalpel blade. The skin margins were undermined to an appropriate distance in all directions utilizing iris scissors. Complex Repair And O-L Flap Text: The defect edges were debeveled with a #15 scalpel blade. The primary defect was closed partially with a complex linear closure. Given the location of the remaining defect, shape of the defect and the proximity to free margins an O-L flap was deemed most appropriate for complete closure of the defect. Using a sterile surgical marker, an appropriate flap was drawn incorporating the defect and placing the expected incisions within the relaxed skin tension lines where possible. The area thus outlined was incised deep to adipose tissue with a #15 scalpel blade. The skin margins were undermined to an appropriate distance in all directions utilizing iris scissors. Complex Repair And Bilobe Flap Text: The defect edges were debeveled with a #15 scalpel blade. The primary defect was closed partially with a complex linear closure. Given the location of the remaining defect, shape of the defect and the proximity to free margins a bilobe flap was deemed most appropriate for complete closure of the defect. Using a sterile surgical marker, an appropriate advancement flap was drawn incorporating the defect and placing the expected incisions within the relaxed skin tension lines where possible. The area thus outlined was incised deep to adipose tissue with a #15 scalpel blade. The skin margins were undermined to an appropriate distance in all directions utilizing iris scissors. Complex Repair And Melolabial Flap Text: The defect edges were debeveled with a #15 scalpel blade. The primary defect was closed partially with a complex linear closure. Given the location of the remaining defect, shape of the defect and the proximity to free margins a melolabial flap was deemed most appropriate for complete closure of the defect. Using a sterile surgical marker, an appropriate advancement flap was drawn incorporating the defect and placing the expected incisions within the relaxed skin tension lines where possible. The area thus outlined was incised deep to adipose tissue with a #15 scalpel blade. The skin margins were undermined to an appropriate distance in all directions utilizing iris scissors. Complex Repair And Rotation Flap Text: The defect edges were debeveled with a #15 scalpel blade. The primary defect was closed partially with a complex linear closure. Given the location of the remaining defect, shape of the defect and the proximity to free margins a rotation flap was deemed most appropriate for complete closure of the defect. Using a sterile surgical marker, an appropriate advancement flap was drawn incorporating the defect and placing the expected incisions within the relaxed skin tension lines where possible. The area thus outlined was incised deep to adipose tissue with a #15 scalpel blade. The skin margins were undermined to an appropriate distance in all directions utilizing iris scissors. Complex Repair And Rhombic Flap Text: The defect edges were debeveled with a #15 scalpel blade. The primary defect was closed partially with a complex linear closure. Given the location of the remaining defect, shape of the defect and the proximity to free margins a rhombic flap was deemed most appropriate for complete closure of the defect. Using a sterile surgical marker, an appropriate advancement flap was drawn incorporating the defect and placing the expected incisions within the relaxed skin tension lines where possible. The area thus outlined was incised deep to adipose tissue with a #15 scalpel blade. The skin margins were undermined to an appropriate distance in all directions utilizing iris scissors. Complex Repair And Transposition Flap Text: The defect edges were debeveled with a #15 scalpel blade. The primary defect was closed partially with a complex linear closure. Given the location of the remaining defect, shape of the defect and the proximity to free margins a transposition flap was deemed most appropriate for complete closure of the defect. Using a sterile surgical marker, an appropriate advancement flap was drawn incorporating the defect and placing the expected incisions within the relaxed skin tension lines where possible. The area thus outlined was incised deep to adipose tissue with a #15 scalpel blade. The skin margins were undermined to an appropriate distance in all directions utilizing iris scissors. Complex Repair And V-Y Plasty Text: The defect edges were debeveled with a #15 scalpel blade. The primary defect was closed partially with a complex linear closure. Given the location of the remaining defect, shape of the defect and the proximity to free margins a V-Y plasty was deemed most appropriate for complete closure of the defect. Using a sterile surgical marker, an appropriate advancement flap was drawn incorporating the defect and placing the expected incisions within the relaxed skin tension lines where possible. The area thus outlined was incised deep to adipose tissue with a #15 scalpel blade. The skin margins were undermined to an appropriate distance in all directions utilizing iris scissors. Complex Repair And M Plasty Text: The defect edges were debeveled with a #15 scalpel blade. The primary defect was closed partially with a complex linear closure. Given the location of the remaining defect, shape of the defect and the proximity to free margins an M plasty was deemed most appropriate for complete closure of the defect. Using a sterile surgical marker, an appropriate advancement flap was drawn incorporating the defect and placing the expected incisions within the relaxed skin tension lines where possible. The area thus outlined was incised deep to adipose tissue with a #15 scalpel blade. The skin margins were undermined to an appropriate distance in all directions utilizing iris scissors. Complex Repair And Double M Plasty Text: The defect edges were debeveled with a #15 scalpel blade. The primary defect was closed partially with a complex linear closure. Given the location of the remaining defect, shape of the defect and the proximity to free margins a double M plasty was deemed most appropriate for complete closure of the defect. Using a sterile surgical marker, an appropriate advancement flap was drawn incorporating the defect and placing the expected incisions within the relaxed skin tension lines where possible. The area thus outlined was incised deep to adipose tissue with a #15 scalpel blade. The skin margins were undermined to an appropriate distance in all directions utilizing iris scissors. Complex Repair And W Plasty Text: The defect edges were debeveled with a #15 scalpel blade. The primary defect was closed partially with a complex linear closure. Given the location of the remaining defect, shape of the defect and the proximity to free margins a W plasty was deemed most appropriate for complete closure of the defect. Using a sterile surgical marker, an appropriate advancement flap was drawn incorporating the defect and placing the expected incisions within the relaxed skin tension lines where possible. The area thus outlined was incised deep to adipose tissue with a #15 scalpel blade. The skin margins were undermined to an appropriate distance in all directions utilizing iris scissors. Complex Repair And Z Plasty Text: The defect edges were debeveled with a #15 scalpel blade. The primary defect was closed partially with a complex linear closure. Given the location of the remaining defect, shape of the defect and the proximity to free margins a Z plasty was deemed most appropriate for complete closure of the defect. Using a sterile surgical marker, an appropriate advancement flap was drawn incorporating the defect and placing the expected incisions within the relaxed skin tension lines where possible. The area thus outlined was incised deep to adipose tissue with a #15 scalpel blade. The skin margins were undermined to an appropriate distance in all directions utilizing iris scissors. Complex Repair And Dorsal Nasal Flap Text: The defect edges were debeveled with a #15 scalpel blade. The primary defect was closed partially with a complex linear closure. Given the location of the remaining defect, shape of the defect and the proximity to free margins a dorsal nasal flap was deemed most appropriate for complete closure of the defect. Using a sterile surgical marker, an appropriate flap was drawn incorporating the defect and placing the expected incisions within the relaxed skin tension lines where possible. The area thus outlined was incised deep to adipose tissue with a #15 scalpel blade. The skin margins were undermined to an appropriate distance in all directions utilizing iris scissors. Complex Repair And Ftsg Text: The defect edges were debeveled with a #15 scalpel blade. The primary defect was closed partially with a complex linear closure. Given the location of the defect, shape of the defect and the proximity to free margins a full thickness skin graft was deemed most appropriate to repair the remaining defect. The graft was trimmed to fit the size of the remaining defect. The graft was then placed in the primary defect, oriented appropriately, and sutured into place. Complex Repair And Burow's Graft Text: The defect edges were debeveled with a #15 scalpel blade. The primary defect was closed partially with a complex linear closure. Given the location of the defect, shape of the defect, the proximity to free margins and the presence of a standing cone deformity a Burow's graft was deemed most appropriate to repair the remaining defect. The graft was trimmed to fit the size of the remaining defect. The graft was then placed in the primary defect, oriented appropriately, and sutured into place. Complex Repair And Split-Thickness Skin Graft Text: The defect edges were debeveled with a #15 scalpel blade. The primary defect was closed partially with a complex linear closure. Given the location of the defect, shape of the defect and the proximity to free margins a split thickness skin graft was deemed most appropriate to repair the remaining defect. The graft was trimmed to fit the size of the remaining defect. The graft was then placed in the primary defect, oriented appropriately, and sutured into place. Complex Repair And Epidermal Autograft Text: The defect edges were debeveled with a #15 scalpel blade. The primary defect was closed partially with a complex linear closure. Given the location of the defect, shape of the defect and the proximity to free margins an epidermal autograft was deemed most appropriate to repair the remaining defect. The graft was trimmed to fit the size of the remaining defect. The graft was then placed in the primary defect, oriented appropriately, and sutured into place. Complex Repair And Dermal Autograft Text: The defect edges were debeveled with a #15 scalpel blade. The primary defect was closed partially with a complex linear closure. Given the location of the defect, shape of the defect and the proximity to free margins an dermal autograft was deemed most appropriate to repair the remaining defect. The graft was trimmed to fit the size of the remaining defect. The graft was then placed in the primary defect, oriented appropriately, and sutured into place. Complex Repair And Tissue Cultured Epidermal Autograft Text: The defect edges were debeveled with a #15 scalpel blade. The primary defect was closed partially with a complex linear closure. Given the location of the defect, shape of the defect and the proximity to free margins an tissue cultured epidermal autograft was deemed most appropriate to repair the remaining defect. The graft was trimmed to fit the size of the remaining defect. The graft was then placed in the primary defect, oriented appropriately, and sutured into place. Complex Repair And Xenograft Text: The defect edges were debeveled with a #15 scalpel blade. The primary defect was closed partially with a complex linear closure. Given the location of the defect, shape of the defect and the proximity to free margins a xenograft was deemed most appropriate to repair the remaining defect. The graft was trimmed to fit the size of the remaining defect. The graft was then placed in the primary defect, oriented appropriately, and sutured into place. Complex Repair And Skin Substitute Graft Text: The defect edges were debeveled with a #15 scalpel blade. The primary defect was closed partially with a complex linear closure. Given the location of the remaining defect, shape of the defect and the proximity to free margins a skin substitute graft was deemed most appropriate to repair the remaining defect. The graft was trimmed to fit the size of the remaining defect. The graft was then placed in the primary defect, oriented appropriately, and sutured into place. Path Notes (To The Dermatopathologist): Size: 2.1 x 2. 0\\nClosure: 4. 0\\nR/o: CN with Moderate to severe Atypia check margins Consent was obtained from the patient. The risks and benefits to therapy were discussed in detail. Specifically, the risks of infection, scarring, bleeding, prolonged wound healing, incomplete removal, allergy to anesthesia, nerve injury and recurrence were addressed. Prior to the procedure, the treatment site was clearly identified and confirmed by the patient. All components of Universal Protocol/PAUSE Rule completed. Post-Care Instructions: I reviewed with the patient in detail post-care instructions. Patient is not to engage in any heavy lifting, exercise, or swimming for the next 14 days. Should the patient develop any fevers, chills, bleeding, severe pain patient will contact the office immediately. Home Suture Removal Text: Patient was provided a home suture removal kit and will remove their sutures at home. If they have any questions or difficulties they will call the office. Where Do You Want The Question To Include Opioid Counseling Located?: Case Summary Tab Billing Type: [employer] Information: Selecting Yes will display possible errors in your note based on the variables you have selected. This validation is only offered as a suggestion for you. PLEASE NOTE THAT THE VALIDATION TEXT WILL BE REMOVED WHEN YOU FINALIZE YOUR NOTE. IF YOU WANT TO FAX A PRELIMINARY NOTE YOU WILL NEED TO TOGGLE THIS TO 'NO' IF YOU DO NOT WANT IT IN YOUR FAXED NOTE. Advancement-Rotation Flap Text: The defect edges were debeveled with a #15 scalpel blade. Given the location of the defect, shape of the defect and the proximity to free margins an advancement-rotation flap was deemed most appropriate. Using a sterile surgical marker, an appropriate flap was drawn incorporating the defect and placing the expected incisions within the relaxed skin tension lines where possible. The area thus outlined was incised deep to adipose tissue with a #15 scalpel blade. The skin margins were undermined to an appropriate distance in all directions utilizing iris scissors. Peng Advancement Flap Text: The defect edges were debeveled with a #15 scalpel blade. Given the location of the defect, shape of the defect and the proximity to free margins a Peng advancement flap was deemed most appropriate. Using a sterile surgical marker, an appropriate advancement flap was drawn incorporating the defect and placing the expected incisions within the relaxed skin tension lines where possible. The area thus outlined was incised deep to adipose tissue with a #15 scalpel blade. The skin margins were undermined to an appropriate distance in all directions utilizing iris scissors.